# Patient Record
Sex: MALE | Race: WHITE | NOT HISPANIC OR LATINO | Employment: OTHER | URBAN - METROPOLITAN AREA
[De-identification: names, ages, dates, MRNs, and addresses within clinical notes are randomized per-mention and may not be internally consistent; named-entity substitution may affect disease eponyms.]

---

## 2017-01-11 ENCOUNTER — TELEPHONE (OUTPATIENT)
Dept: NEUROLOGY | Facility: CLINIC | Age: 67
End: 2017-01-11

## 2017-01-11 NOTE — TELEPHONE ENCOUNTER
----- Message from Armin Daigle sent at 1/11/2017 10:58 AM CST -----  Contact: Becka with [employer]  [phone] ext [phone]  Caller is calling to speak with Minnie about the prior authorization for the XENAZINE 12.5 mg tablet.

## 2017-02-21 ENCOUNTER — TELEPHONE (OUTPATIENT)
Dept: NEUROLOGY | Facility: CLINIC | Age: 67
End: 2017-02-21

## 2017-02-22 NOTE — TELEPHONE ENCOUNTER
----- Message from Angeles Smith sent at 2/21/2017 11:25 AM CST -----  Contact: Belen Godfrey (Spouse)  Belen states they will be in town Feb 23rd - Feb 27th would like to know if he can be seen due to some problems he is having?    Contact [phone]  Thanks

## 2017-02-22 NOTE — TELEPHONE ENCOUNTER
----- Message from Shelley Jules sent at 2/21/2017  4:53 PM CST -----  Contact: Charisse (wife) [phone]  Charisse is calling to get an appt for her  to see Dr. Porter for his declining movement.

## 2017-02-23 ENCOUNTER — OFFICE VISIT (OUTPATIENT)
Dept: NEUROLOGY | Facility: CLINIC | Age: 67
End: 2017-02-23
Payer: MEDICARE

## 2017-02-23 ENCOUNTER — LAB VISIT (OUTPATIENT)
Dept: LAB | Facility: HOSPITAL | Age: 67
End: 2017-02-23
Attending: PSYCHIATRY & NEUROLOGY
Payer: MEDICARE

## 2017-02-23 VITALS
HEART RATE: 50 BPM | SYSTOLIC BLOOD PRESSURE: 155 MMHG | BODY MASS INDEX: 28.41 KG/M2 | WEIGHT: 198.44 LBS | HEIGHT: 70 IN | DIASTOLIC BLOOD PRESSURE: 81 MMHG

## 2017-02-23 DIAGNOSIS — R30.0 DYSURIA: ICD-10-CM

## 2017-02-23 DIAGNOSIS — G10 HD (HUNTINGTON CHOREA): Primary | ICD-10-CM

## 2017-02-23 LAB
BILIRUB UR QL STRIP: NEGATIVE
CLARITY UR REFRACT.AUTO: CLEAR
COLOR UR AUTO: YELLOW
GLUCOSE UR QL STRIP: NEGATIVE
HGB UR QL STRIP: NEGATIVE
KETONES UR QL STRIP: NEGATIVE
LEUKOCYTE ESTERASE UR QL STRIP: NEGATIVE
NITRITE UR QL STRIP: NEGATIVE
PH UR STRIP: 5 [PH] (ref 5–8)
PROT UR QL STRIP: NEGATIVE
SP GR UR STRIP: 1.01 (ref 1–1.03)
URN SPEC COLLECT METH UR: NORMAL
UROBILINOGEN UR STRIP-ACNC: NEGATIVE EU/DL

## 2017-02-23 PROCEDURE — 99213 OFFICE O/P EST LOW 20 MIN: CPT | Mod: PBBFAC | Performed by: PSYCHIATRY & NEUROLOGY

## 2017-02-23 PROCEDURE — 99999 PR PBB SHADOW E&M-EST. PATIENT-LVL III: CPT | Mod: PBBFAC,,, | Performed by: PSYCHIATRY & NEUROLOGY

## 2017-02-23 PROCEDURE — 81003 URINALYSIS AUTO W/O SCOPE: CPT

## 2017-02-23 PROCEDURE — 99215 OFFICE O/P EST HI 40 MIN: CPT | Mod: S$PBB,,, | Performed by: PSYCHIATRY & NEUROLOGY

## 2017-02-23 RX ORDER — OLANZAPINE 5 MG/1
5 TABLET ORAL NIGHTLY
COMMUNITY
End: 2018-11-27

## 2017-02-23 RX ORDER — FERROUS SULFATE 325(65) MG
325 TABLET ORAL
COMMUNITY
End: 2022-07-08

## 2017-02-23 RX ORDER — TETRABENAZINE 12.5 MG/1
12.5 TABLET, COATED ORAL 3 TIMES DAILY
Qty: 270 TABLET | Refills: 3 | Status: SHIPPED | OUTPATIENT
Start: 2017-02-23 | End: 2018-06-12

## 2017-02-23 RX ORDER — DULOXETIN HYDROCHLORIDE 20 MG/1
20 CAPSULE, DELAYED RELEASE ORAL
COMMUNITY
Start: 2017-01-12 | End: 2022-05-26

## 2017-02-23 RX ORDER — OMEPRAZOLE 20 MG/1
20 CAPSULE, DELAYED RELEASE ORAL DAILY
COMMUNITY
End: 2022-07-08

## 2017-02-23 RX ORDER — LISINOPRIL 10 MG/1
10 TABLET ORAL
COMMUNITY

## 2017-02-23 RX ORDER — OXYBUTYNIN CHLORIDE 10 MG/1
10 TABLET, EXTENDED RELEASE ORAL
COMMUNITY
End: 2022-07-08

## 2017-02-23 RX ORDER — VIT C/E/ZN/COPPR/LUTEIN/ZEAXAN 250MG-90MG
1000 CAPSULE ORAL DAILY
COMMUNITY
End: 2022-07-08

## 2017-02-23 RX ORDER — GABAPENTIN 300 MG/1
300 CAPSULE ORAL
COMMUNITY
Start: 2017-01-30 | End: 2018-07-03

## 2017-02-23 RX ORDER — TAMSULOSIN HYDROCHLORIDE 0.4 MG/1
0.4 CAPSULE ORAL
COMMUNITY

## 2017-02-23 NOTE — LETTER
February 26, 2017      Troy Baker MD  [address]  [facility] [address]           [facility]  [address]  [facility] [address]  Phone: [phone]  Fax: [phone]          Patient: Filiberto Godfrey   MR Number: [id number]   YOB: 1950   Date of Visit: 2/23/2017       Dear Dr. Troy Baker:    Thank you for referring Filiberto Godfrey to me for evaluation. Attached you will find relevant portions of my assessment and plan of care.    If you have questions, please do not hesitate to call me. I look forward to following Filiberto Godfrey along with you.    Sincerely,    Xavi Porter MD    Enclosure  CC:  No Recipients    If you would like to receive this communication electronically, please contact [email or web address] or [phone] to request more information on [employer] access.    For providers and/or their staff who would like to refer a patient to [facility], please contact us through our one-stop-shop provider referral line, [facility], at [phone].    If you feel you have received this communication in error or would no longer like to receive these types of communications, please e-mail [email or web address]

## 2017-02-23 NOTE — PROGRESS NOTES
"Name: Filiberto Godfrey  MRN: [id number]   CSN: [id number]      Date: 02/23/2017    Referring physician:  Troy Baker MD  [address] [place] [address]    Chief Complaint / Interval History:   - Accompanied by his wife  - Saw Dr. Baker who instructed Mr. Godfrey to stop his tetrabenazine  - He had a lot of grunting tics  - After being off TBZ for three days, his chorea got really bad  - Was started on gabapentin by Dr. Baker after which his chorea continued to worsen and he began having rapid shallow breathing    - He had trouble getting a follow up appointment with Dr. Baker so he saw a family doctor, at that time he was breathing at a rate of 30s to 40s  - Gabapentin was increased and he was recommended to start on olanzapine but did not start this   - His walking became bad with toe walking on his right side  - Having some gait festination and freezing   - He is choking on water when he swallows  - Has had personality changes where he is turning into more of a "walflower" and has been more quiet  - He is no longer able to shave safely  - having dribbling of urine   - Urine is dark and odorous    From last visit 8/25/16:   - started tbz at interdisciplinary visit, only at night.  Movements the same, gait is worsening.  Not s ure why they never went higher on dosing.  - moving next month to [place] - will plan to see Olivia  - more repeating self and memory  - feels sick and tired of this type of living" - mentions the frustration with the grunts and trunk movements      History of Present Illness (HPI):  64 yo with HD    Movements - initially developed leg movements, "couldn't sit still" affecting arms and legs eventually.  Misdiagnosed as RLS at that time.  But progressed to involve whole body movements.  Saw "some neurologist" in [place].  Then saw Dr. Sanders, he referred to Kavita at [facility].      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   - invested $2.1 million, bad timing, but also some mismanagement from him that contributed to major losses.  Declared bankruptcy.     Mood - more irritable.  Tried Haldol for sleep about 2 years ago.  Now using Seroquel for sleep.  Restless at night, and gets up a lot at night.  Says he sleeps or lays down during the day to minimize the grunting.  On Wellbutrin/Klonopin/Tizanidine, unclear when or why prescribed.  Apparently he has trouble managing his medications, might be taking more klonopin during the day than prescribed.  Demonstrated recklessness in the past with gambling.  No extramarital affairs.  Has depression and feels like he is worthless, cannot stand the growling/grunting.  Lots of worry about the future and now.    Generally good until  of this year.  Movements increased.  Now more vocal tics and grunting.  Contributing to irritability.    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  Chaotic then, said they lost his blood test twice...    Family history: Mother  at age 69 (had tongue thrust, impaired gait, and mood/memory changes), brother has symptoms (CAG 41), two maternal cousins (unk CAG repeats).  Has two unaffected sibs (to date, 1 bro, 1 sis).     30+ years, 2 sons (age 30, 32), 3 grandkids.       Living in [facility].      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 No  Falls (Musculoskeletal)?Yes - at risk  Cognitive impairment (Neurologic)?Yes  Psychoses (Psychiatric)?No - no hallucinations or delusions  Pain/Paresthesia (Neurologic)?No  Visual changes (Eyes)?No  Moles / skin changes (Skin)?No  Stridor / SOB (Pulm)?No  Bruising  "(Heme)?No    Past Medical History: The patient  has a past medical history of Diabetes mellitus; Heart disease; Memory loss; Mental disorder; and Movement disorder.    Social History: The patient  reports that he has never smoked. He does not have any smokeless tobacco history on file. He reports that he does not drink alcohol or use illicit drugs.    Family History: Their family history is not on file.    Allergies: Review of patient's allergies indicates no known allergies.     Meds:   Current Outpatient Prescriptions on File Prior to Visit   Medication Sig Dispense Refill    amlodipine (NORVASC) 10 MG tablet       aspirin (ECOTRIN) 81 MG EC tablet Take 81 mg by mouth once daily.      atorvastatin (LIPITOR) 20 MG tablet Take 20 mg by mouth once daily.      clonazePAM (KLONOPIN) 1 MG tablet 1 mg.       docusate sodium (COLACE) 50 MG capsule Take by mouth 2 (two) times daily.      isosorbide mononitrate (IMDUR) 30 MG 24 hr tablet Take 20 mg by mouth once daily.      trazodone (DESYREL) 50 MG tablet Take 50 mg by mouth every evening.      XENAZINE 12.5 mg tablet TAKE 1 TABLET (12.5MG) DAILY FOR 1 WEEK, THEN TAKE 1 TABLET TWICE DAILY FOR 1 WEEK, THEN TAKE 1 TABLET 3 TIMES DAILY THEREAFTER. MAY BE TAKE 69 tablet 3    buPROPion (WELLBUTRIN SR) 150 MG TBSR 12 hr tablet       meclizine (ANTIVERT) 25 mg tablet       nitroGLYCERIN (NITROSTAT) 0.4 MG SL tablet Place 0.4 mg under the tongue every 5 (five) minutes as needed for Chest pain.      ondansetron (ZOFRAN-ODT) 4 MG TbDL       tizanidine 4 mg Cap Take 4 mg by mouth once daily.       No current facility-administered medications on file prior to visit.        Exam:  Visit Vitals    BP (!) 155/81 (BP Location: Left arm, Patient Position: Sitting, BP Method: Automatic)    Pulse (!) 50    Ht 5' 10" (1.778 m)    Wt 90 kg (198 lb 6.6 oz)    BMI 28.47 kg/m2       * Specialized movement exam  No hypophonic speech.    No facial masking.   No cogwheel rigidity.  "    + mild B bradykinesia.   No tremor with rest, posture, kinesis, or intention.    See UHDRS below.  Includes chorea and grunting tics.   No myoclonus.   No motor impersistence.   Wide-based gait.   No shortened stride length.  + extensor abnormal arm swing.     ++ postural instability.      UHDRS Chorea Score    Face 2   NITIN 2   Trunk 3   RUE 3   LUE 3   RLE 3   LLE 3       TOTAL 19       0 Absent  1 Slight/intermittent  2 Mild/common or moderate/intermittent  3 Moderate/common  4  Marked/prolonged    Laboratory/Radiological:  - Results:No results found for any previous visit.    - Independent review of images:    Diagnoses:          1) HD, moderate in severity at this time.  WORSE  2) Depression, contracts today for safety    Medical Decision Makin) Resume the TBZ 12.5mg BID  2) SLP and swallow eval  3) Reorder therapy, PT and gait training    4) Check urinalysis, urine cx    Roland Crouch MD  Movement Disorders Fellow  [facility]    ===========  Patient seen and examined.  I agree with the history, exam, assessment and plan within the fellow's note as stated above.  Note has been edited by me to reflect my work and changes.    Going back on TBZ now, will continue to follow here.  Risks and benefits discussed.  Rest of plan as above.    Xavi Porter MD, MPH  Division of Movement and Memory Disorders  [facility]

## 2018-06-05 ENCOUNTER — TELEPHONE (OUTPATIENT)
Dept: NEUROLOGY | Facility: CLINIC | Age: 68
End: 2018-06-05

## 2018-06-05 DIAGNOSIS — R13.10 DYSPHAGIA, UNSPECIFIED TYPE: ICD-10-CM

## 2018-06-05 DIAGNOSIS — R26.9 GAIT DISORDER: ICD-10-CM

## 2018-06-05 DIAGNOSIS — G10 HD (HUNTINGTON CHOREA): Primary | ICD-10-CM

## 2018-06-05 NOTE — TELEPHONE ENCOUNTER
----- Message from Mika Mendenhall sent at 6/5/2018 12:02 PM CDT -----  Needs Advice    Reason for call: Belen is calling to schedule an appt w/ the doctor either this week or next week on Monday. Pt last seen 02/23/17. Belen states pt is having speech difficulties and strength/walking difficulties.  Communication Preference: Belen (mom) @ [phone]  Additional Information:

## 2018-06-05 NOTE — TELEPHONE ENCOUNTER
Returned call to Belen and she states swallowing and balance is worse. He having trouble finding his words. DTBZ was prescribe by Dr. Baker on 9mg BID. Started medication in January. He has had study decline at this point. They are in town this week. Explained we are out at the [facility] [place] this week.     She is not sure if they need to get back on TBZ or SLP and swallow study and PT needs for patient. Offered appointment Friday, but will not be in town.     Needs a call back.     Vanesa  HD Clinic Coordinator

## 2018-06-06 ENCOUNTER — OFFICE VISIT (OUTPATIENT)
Dept: NEUROLOGY | Facility: CLINIC | Age: 68
End: 2018-06-06
Payer: MEDICARE

## 2018-06-06 VITALS
SYSTOLIC BLOOD PRESSURE: 131 MMHG | HEART RATE: 75 BPM | DIASTOLIC BLOOD PRESSURE: 76 MMHG | WEIGHT: 198.44 LBS | BODY MASS INDEX: 28.41 KG/M2 | HEIGHT: 70 IN

## 2018-06-06 DIAGNOSIS — G10 HUNTINGTON DISEASE: Primary | ICD-10-CM

## 2018-06-06 PROCEDURE — 99999 PR PBB SHADOW E&M-EST. PATIENT-LVL III: CPT | Mod: PBBFAC,,, | Performed by: PSYCHIATRY & NEUROLOGY

## 2018-06-06 PROCEDURE — 99213 OFFICE O/P EST LOW 20 MIN: CPT | Mod: PBBFAC | Performed by: PSYCHIATRY & NEUROLOGY

## 2018-06-06 PROCEDURE — 99215 OFFICE O/P EST HI 40 MIN: CPT | Mod: S$PBB,,, | Performed by: PSYCHIATRY & NEUROLOGY

## 2018-06-06 RX ORDER — FINASTERIDE 5 MG/1
TABLET, FILM COATED ORAL
Refills: 3 | COMMUNITY
Start: 2018-03-28

## 2018-06-06 RX ORDER — AMOXICILLIN 250 MG
1 CAPSULE ORAL
COMMUNITY
End: 2022-07-08

## 2018-06-06 NOTE — PROGRESS NOTES
"Name: Filiberto Godfrey  MRN: [id number]   CSN: [id number]      Date: 06/06/2018    Referring physician:  No referring provider defined for this encounter.    Chief Complaint / Interval History:  - worsened speech and mouth movements that get in the way of communicating  - more grunting, more repeating phrases, some vocal tics, more tongue protrusiona nd bumping his head back, keeps his legs crossed and actually hits up against the underside of the table  - choking was a bad but now MUCH better (her words), rare cough after drinking  - a little mood instability  - constipated of and on   - some urinary urgency and cannot control it  - using finasteride 5 mg 2 months ago   - no LH or dizziness when standing  - loss of equilibrium doubled over 6 months     From FEb 2017:  - Accompanied by his wife  - Saw Dr. Baker who instructed Mr. Godfrey to stop his tetrabenazine  - He had a lot of grunting tics  - After being off TBZ for three days, his chorea got really bad  - Was started on gabapentin by Dr. Baker after which his chorea continued to worsen and he began having rapid shallow breathing    - He had trouble getting a follow up appointment with Dr. Baker so he saw a family doctor, at that time he was breathing at a rate of 30s to 40s  - Gabapentin was increased and he was recommended to start on olanzapine but did not start this   - His walking became bad with toe walking on his right side  - Having some gait festination and freezing   - He is choking on water when he swallows  - Has had personality changes where he is turning into more of a "walflower" and has been more quiet  - He is no longer able to shave safely  - having dribbling of urine   - Urine is dark and odorous    From last visit 8/25/16:   - started tbz at interdisciplinary visit, only at night.  Movements the same, gait is worsening.  Not s ure why they never went higher on dosing.  - moving next month to [place] - will plan to see Olivia  - more repeating " "self and memory  - feels sick and tired of this type of living" - mentions the frustration with the grunts and trunk movements      History of Present Illness (HPI):  64 yo with HD    Movements - initially developed leg movements, "couldn't sit still" affecting arms and legs eventually.  Misdiagnosed as RLS at that time.  But progressed to involve whole body movements.  Saw "some neurologist" in [place].  Then saw Dr. Sanders, he referred to Kavita at [facility].      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  2008 - invested $2.1 million, bad timing, but also some mismanagement from him that contributed to major losses.  Declared bankruptcy.     Mood - more irritable.  Tried Haldol for sleep about 2 years ago.  Now using Seroquel for sleep.  Restless at night, and gets up a lot at night.  Says he sleeps or lays down during the day to minimize the grunting.  On Wellbutrin/Klonopin/Tizanidine, unclear when or why prescribed.  Apparently he has trouble managing his medications, might be taking more klonopin during the day than prescribed.  Demonstrated recklessness in the past with gambling.  No extramarital affairs.  Has depression and feels like he is worthless, cannot stand the growling/grunting.  Lots of worry about the future and now.    Generally good until Altaf of this year.  Movements increased.  Now more vocal tics and grunting.  Contributing to irritability.    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  Chaotic then, said they lost his blood test twice...    Family history: Mother  at age 69 (had tongue thrust, impaired gait, and mood/memory changes), brother has symptoms (CAG 41), two maternal cousins (unk CAG repeats).  Has two unaffected sibs (to date, 1 bro, 1 sis).     30+ years, 2 sons (age 30, 32), 3 grandkids.       Living in [facility].      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 No  Falls (Musculoskeletal)?Yes - at risk  Cognitive impairment (Neurologic)?Yes  Psychoses (Psychiatric)?No - no hallucinations or delusions  Pain/Paresthesia (Neurologic)?No  Visual changes (Eyes)?No  Moles / skin changes (Skin)?No  Stridor / SOB (Pulm)?No  Bruising (Heme)?No    Past Medical History: The patient  has a past medical history of Diabetes mellitus; Heart disease; Memory loss; Mental disorder; and Movement disorder.    Social History: The patient  reports that he has never smoked. He does not have any smokeless tobacco history on file. He reports that he does not drink alcohol or use drugs.    Family History: Their family history is not on file.    Allergies: Patient has no known allergies.     Meds:   Current Outpatient Prescriptions on File Prior to Visit   Medication Sig Dispense Refill    amlodipine (NORVASC) 10 MG tablet       aspirin (ECOTRIN) 81 MG EC tablet Take 81 mg by mouth once daily.      atorvastatin (LIPITOR) 20 MG tablet Take 20 mg by mouth once daily.      cholecalciferol, vitamin D3, (VITAMIN D3) 1,000 unit capsule Take 1,000 Units by mouth once daily.      clonazePAM (KLONOPIN) 1 MG tablet 1 mg.       docusate sodium (COLACE) 50 MG capsule Take by mouth 2 (two) times daily.      ferrous sulfate 325 mg (65 mg iron) Tab tablet Take 325 mg by mouth daily with breakfast.      isosorbide mononitrate (IMDUR) 30 MG 24 hr tablet Take 20 mg by mouth once daily.      lisinopril 10 MG tablet Take 10 mg by mouth.      olanzapine (ZYPREXA) 5 MG tablet Take 5 mg by mouth every evening.      omeprazole (PRILOSEC) 20 MG capsule Take 20 mg by mouth once daily.      ondansetron (ZOFRAN-ODT) 4  "MG TbDL       SITagliptan-metformin (JANUMET) 50-1,000 mg per tablet Take 1 tablet by mouth 2 (two) times daily with meals.      tamsulosin (FLOMAX) 0.4 mg Cp24 Take 0.4 mg by mouth.      buPROPion (WELLBUTRIN SR) 150 MG TBSR 12 hr tablet       duloxetine (CYMBALTA) 20 MG capsule Take 20 mg by mouth.      gabapentin (NEURONTIN) 300 MG capsule Take 300 mg by mouth.      meclizine (ANTIVERT) 25 mg tablet       nitroGLYCERIN (NITROSTAT) 0.4 MG SL tablet Place 0.4 mg under the tongue every 5 (five) minutes as needed for Chest pain.      oxybutynin (DITROPAN-XL) 10 MG 24 hr tablet Take 10 mg by mouth.      tetrabenazine (XENAZINE) 12.5 mg tablet Take 1 tablet (12.5 mg total) by mouth 3 (three) times daily. 270 tablet 3    tizanidine 4 mg Cap Take 4 mg by mouth once daily.      trazodone (DESYREL) 50 MG tablet Take 50 mg by mouth every evening.      XENAZINE 12.5 mg tablet TAKE 1 TABLET (12.5MG) DAILY FOR 1 WEEK, THEN TAKE 1 TABLET TWICE DAILY FOR 1 WEEK, THEN TAKE 1 TABLET 3 TIMES DAILY THEREAFTER. MAY BE TAKE 69 tablet 3     No current facility-administered medications on file prior to visit.        Exam:  /76   Pulse 75   Ht 5' 10" (1.778 m)   Wt 90 kg (198 lb 6.6 oz)   BMI 28.47 kg/m²     * Specialized movement exam  No hypophonic speech.    No facial masking.   No cogwheel rigidity.     + mild B bradykinesia.   No tremor with rest, posture, kinesis, or intention.    See UHDRS below.  Includes chorea and grunting tics.   No myoclonus.   No motor impersistence.   Wide-based gait.   No shortened stride length.  + extensor abnormal arm swing.     ++ postural instability.      UHDRS Chorea Score    Face 2   NITIN 2   Trunk 3   RUE 3   LUE 3   RLE 3   LLE 3       TOTAL 19       0 Absent  1 Slight/intermittent  2 Mild/common or moderate/intermittent  3 Moderate/common  4  Marked/prolonged    Laboratory/Radiological:  - Results:  No visits with results within 3 Month(s) from this visit.   Latest known " visit with results is:   Lab Visit on 02/23/2017   Component Date Value Ref Range Status    Specimen UA 02/23/2017 Urine, Unspecified   Final    Color, UA 02/23/2017 Yellow  Yellow, Straw, Khushi Final    Appearance, UA 02/23/2017 Clear  Clear Final    pH, UA 02/23/2017 5.0  5.0 - 8.0 Final    Specific Gravity, UA 02/23/2017 1.015  1.005 - 1.030 Final    Protein, UA 02/23/2017 Negative  Negative Final    Glucose, UA 02/23/2017 Negative  Negative Final    Ketones, UA 02/23/2017 Negative  Negative Final    Bilirubin (UA) 02/23/2017 Negative  Negative Final    Occult Blood UA 02/23/2017 Negative  Negative Final    Nitrite, UA 02/23/2017 Negative  Negative Final    Urobilinogen, UA 02/23/2017 Negative  <2.0 EU/dL Final    Leukocytes, UA 02/23/2017 Negative  Negative Final       - Independent review of images:    Diagnoses:          1) HD, moderate in severity at this time.  WORSE  2) Depression, contracts today for safety    Medical Decision Making:  - pushing austedo to 12 AM and 9 PM --> 12-12  - namenda next  - pt/ot  - vv in 4-6 weeks  -  -      Xavi Porter MD, MPH  Division of Movement and Memory Disorders  [facility]

## 2018-06-07 ENCOUNTER — TELEPHONE (OUTPATIENT)
Dept: SPEECH THERAPY | Facility: HOSPITAL | Age: 68
End: 2018-06-07

## 2018-06-12 ENCOUNTER — TELEPHONE (OUTPATIENT)
Dept: SPEECH THERAPY | Facility: HOSPITAL | Age: 68
End: 2018-06-12

## 2018-06-12 ENCOUNTER — TELEPHONE (OUTPATIENT)
Dept: NEUROLOGY | Facility: CLINIC | Age: 68
End: 2018-06-12

## 2018-06-12 ENCOUNTER — TELEPHONE (OUTPATIENT)
Dept: PHARMACY | Facility: CLINIC | Age: 68
End: 2018-06-12

## 2018-06-12 DIAGNOSIS — R13.10 DYSPHAGIA, UNSPECIFIED TYPE: ICD-10-CM

## 2018-06-12 DIAGNOSIS — R47.1 DYSARTHRIA: ICD-10-CM

## 2018-06-12 DIAGNOSIS — G10 HD (HUNTINGTON CHOREA): Primary | ICD-10-CM

## 2018-06-12 NOTE — TELEPHONE ENCOUNTER
Pt wife stated that her  is getting austedo 15 mg a.m and 9 mg p.m through [facility].  Pt would also like to get swallow study done at a facility near his location. Order is in and routed over to provider.

## 2018-06-12 NOTE — TELEPHONE ENCOUNTER
Austedo pre-verification complete. No DDIs with [facility] encountered upon initial review.   Tetrabenazine 50 mg/day = deutetrabenazine 12 mg twice daily

## 2018-06-12 NOTE — TELEPHONE ENCOUNTER
----- Message from Kimmy Torres sent at 6/12/2018  3:10 PM CDT -----  Contact: Elise (wife) @ [phone]  Calling to speak with someone in Dr. Porter's office regarding the patient swallow test, asking to have the referrals/orders sent to the patient so he can have the test closer to home. Says she needs to speak with someone regarding the medication change that the doctor made to the dosage. Please call.

## 2018-06-13 NOTE — TELEPHONE ENCOUNTER
DOCUMENTATION ONLY:  Prior Authorization for [employer] approved from 02/13/2018 to 12/31/2018    Case Id: [id number]    Co-pay: $2746.70    Patient Assistance IS required and is being researched.   SHYANNE

## 2018-06-13 NOTE — TELEPHONE ENCOUNTER
Called patient's wife and they would like to have MBSS done at [facility] at [phone]. Called and spoke to Landy and she states they need procedure codes and diagnose code before scheduling patient for MBSS.She states they can not schedule without procedure codes.

## 2018-06-21 ENCOUNTER — TELEPHONE (OUTPATIENT)
Dept: NEUROLOGY | Facility: CLINIC | Age: 68
End: 2018-06-21

## 2018-06-21 NOTE — TELEPHONE ENCOUNTER
Called [facility] and clarified Rx. Also called and updated patient's wife as well.     Called to scheduled MBSS as well for patient now we have procedure code of 32205.     Orders has been faxed to [phone] and patient is scheduled for June 27 at 9am. Patient is currently receiving home health for PT/OT. Patient's wife make aware of appointment and confirmed time and date. She had no further questions.     Vanesa   HD Clinic Coordinator

## 2018-06-21 NOTE — TELEPHONE ENCOUNTER
----- Message from Kimmy Torres sent at 6/21/2018  1:09 PM CDT -----  Contact: Belen (wife) @ [phone]  Calling to speak with someone on medication: deutetrabenazine 6 mg Tab, says that the doctor told them something completely different from the prescription. Asking for clarification and to please call [facility] ([facility]), today to have the medication shipped today.

## 2018-06-27 ENCOUNTER — TELEPHONE (OUTPATIENT)
Dept: NEUROLOGY | Facility: CLINIC | Age: 68
End: 2018-06-27

## 2018-06-27 NOTE — TELEPHONE ENCOUNTER
----- Message from Armin Daigle sent at 6/27/2018  9:39 AM CDT -----  Contact: [facility] @ [phone]   Caller is calling to get clarity on the orders, pls call ASAP pt is @ the location now.

## 2018-06-27 NOTE — TELEPHONE ENCOUNTER
----- Message from Carlene Lam sent at 6/27/2018  3:48 PM CDT -----  Contact: Elise (Wife) [phone]  Needs Advice    Reason for call:   The patient would like to speak to someone regarding a release form to receive the patient's test results.    Communication Preference:PHONE     Additional Information:

## 2018-06-27 NOTE — TELEPHONE ENCOUNTER
Returned call and clarified orders for MBSS. They had no further questions.     Vanesa  HD Clinic Coordinator

## 2018-06-27 NOTE — TELEPHONE ENCOUNTER
Returned call to pt's wife and she explained we need a MANPREET to received the results. Explained will call POS to see what is needed.     Called and spoke to Rosalind and she states the results were mailed to office. She ask if we would like a copy of the results fax to office. Ask to have report faxed to the office. She states she will have results faxed to the office by the end of day tomorrow and no MANPREET is needed as Dr. Porter is the ordering provider. She ask to call back if had not received.     Vanesa   HD Clinic Coordinator

## 2018-06-29 ENCOUNTER — TELEPHONE (OUTPATIENT)
Dept: NEUROLOGY | Facility: CLINIC | Age: 68
End: 2018-06-29

## 2018-06-29 NOTE — TELEPHONE ENCOUNTER
----- Message from Kimmy Torres sent at 6/29/2018  2:32 PM CDT -----  Contact: Elise (wife) @ [phone]  Calling to speak with someone in Dr. Porter's office regarding, says the office knows what the call is about. Asking if they need to come to the office or its information that the doctor can give over the phone. Please call.

## 2018-06-29 NOTE — TELEPHONE ENCOUNTER
Returned call to wife and explained we had not received the faxed report to the office. She states she will go and get the report and send to the office. Explained once received we will give the report to Dr. Porter to review.

## 2018-07-03 ENCOUNTER — TELEPHONE (OUTPATIENT)
Dept: GASTROENTEROLOGY | Facility: CLINIC | Age: 68
End: 2018-07-03

## 2018-07-03 ENCOUNTER — HOSPITAL ENCOUNTER (OUTPATIENT)
Dept: CARDIOLOGY | Facility: CLINIC | Age: 68
Discharge: HOME | End: 2018-07-03
Payer: MEDICARE

## 2018-07-03 ENCOUNTER — OFFICE VISIT (OUTPATIENT)
Dept: NEUROLOGY | Facility: CLINIC | Age: 68
End: 2018-07-03
Payer: MEDICARE

## 2018-07-03 VITALS
SYSTOLIC BLOOD PRESSURE: 123 MMHG | DIASTOLIC BLOOD PRESSURE: 79 MMHG | HEART RATE: 70 BPM | WEIGHT: 199.31 LBS | HEIGHT: 70 IN | BODY MASS INDEX: 28.53 KG/M2

## 2018-07-03 DIAGNOSIS — G10 HUNTINGTON DISEASE: Primary | ICD-10-CM

## 2018-07-03 DIAGNOSIS — G10 HUNTINGTON DISEASE: ICD-10-CM

## 2018-07-03 PROCEDURE — 93010 ELECTROCARDIOGRAM REPORT: CPT | Mod: S$PBB,,, | Performed by: INTERNAL MEDICINE

## 2018-07-03 PROCEDURE — 99213 OFFICE O/P EST LOW 20 MIN: CPT | Mod: PBBFAC,25 | Performed by: PSYCHIATRY & NEUROLOGY

## 2018-07-03 PROCEDURE — 99999 PR PBB SHADOW E&M-EST. PATIENT-LVL III: CPT | Mod: PBBFAC,,, | Performed by: PSYCHIATRY & NEUROLOGY

## 2018-07-03 PROCEDURE — 99214 OFFICE O/P EST MOD 30 MIN: CPT | Mod: S$PBB,,, | Performed by: PSYCHIATRY & NEUROLOGY

## 2018-07-03 PROCEDURE — 93005 ELECTROCARDIOGRAM TRACING: CPT | Mod: PBBFAC | Performed by: INTERNAL MEDICINE

## 2018-07-03 NOTE — LETTER
July 10, 2018      James Lomeli MD  [address] [place]  [facility] [address]           [facility]  [address]  [place] [address]  Phone: [phone]  Fax: [phone]          Patient: Filiberto Godfrey   MR Number: [id number]   YOB: 1950   Date of Visit: 7/3/2018       Dear Dr. James Lomeli:    Thank you for referring Filiberto Godfrey to me for evaluation. Attached you will find relevant portions of my assessment and plan of care.    If you have questions, please do not hesitate to call me. I look forward to following Filiberto Godfrey along with you.    Sincerely,    Tiana Berrios  CC:  No Recipients    If you would like to receive this communication electronically, please contact [email or web address] or [phone] to request more information on "[employer] Link access.    For providers and/or their staff who would like to refer a patient to [facility], please contact us through our one-stop-shop provider referral line, [facility], at [phone].    If you feel you have received this communication in error or would no longer like to receive these types of communications, please e-mail [email or web address]

## 2018-07-03 NOTE — PATIENT INSTRUCTIONS
New dose of Austedo. Increased to 15mg twice a day.     Austedo 6mg+9mg twice a daily = total of 15mg twice a day.     Set up [employer] account.     Schedule a video visit with Dr. Porter in the next 4-6 weeks after increase of medication.     Called [facility] direct line for any questions. [phone]. You can reach Vanesa or ELISE Cancino at this number.     Vanesa   HD Clinic Coordinator

## 2018-07-03 NOTE — TELEPHONE ENCOUNTER
----- Message from Norma Pelaez sent at 7/3/2018 12:46 PM CDT -----  Contact: javier/neurology - [phone]  Needs peg placement - please call javier/neurology - [phone]

## 2018-07-03 NOTE — PROGRESS NOTES
"Name: Filiberto Godfrey  MRN: [id number]   CSN: [id number]      Date: 07/03/2018    Referring physician:  James Lomeli MD  [address] [place] [address] Dr Larson [address] [place] [address]    Chief Complaint / Interval History:  - generally less aware  - more swallow issues  - fall risk  - willing to wiggle meds  - not traveling 'for nothing"      Last seen 6/6/18:  - worsened speech and mouth movements that get in the way of communicating  - more grunting, more repeating phrases, some vocal tics, more tongue protrusiona nd bumping his head back, keeps his legs crossed and actually hits up against the underside of the table  - choking was a bad but now MUCH better (her words), rare cough after drinking  - a little mood instability  - constipated of and on   - some urinary urgency and cannot control it  - using finasteride 5 mg 2 months ago   - no LH or dizziness when standing  - loss of equilibrium doubled over 6 months     From FEb 2017:  - Accompanied by his wife  - Saw Dr. Baker who instructed Mr. Godfrey to stop his tetrabenazine  - He had a lot of grunting tics  - After being off TBZ for three days, his chorea got really bad  - Was started on gabapentin by Dr. Baker after which his chorea continued to worsen and he began having rapid shallow breathing    - He had trouble getting a follow up appointment with Dr. Baker so he saw a family doctor, at that time he was breathing at a rate of 30s to 40s  - Gabapentin was increased and he was recommended to start on olanzapine but did not start this   - His walking became bad with toe walking on his right side  - Having some gait festination and freezing   - He is choking on water when he swallows  - Has had personality changes where he is turning into more of a "walflower" and has been more quiet  - He is no longer able to shave safely  - having dribbling of urine   - Urine is dark and odorous    From last visit 8/25/16:   - started tbz at interdisciplinary visit, only at night.  " "Movements the same, gait is worsening.  Not s ure why they never went higher on dosing.  - moving next month to [place] - will plan to see Olivia  - more repeating self and memory  - feels sick and tired of this type of living" - mentions the frustration with the grunts and trunk movements      History of Present Illness (HPI):  66 yo with HD    Movements - initially developed leg movements, "couldn't sit still" affecting arms and legs eventually.  Misdiagnosed as RLS at that time.  But progressed to involve whole body movements.  Saw "some neurologist" in [place].  Then saw Dr. Sanders, he referred to Kavita at [facility].      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   - invested $2.1 million, bad timing, but also some mismanagement from him that contributed to major losses.  Declared bankruptcy.     Mood - more irritable.  Tried Haldol for sleep about 2 years ago.  Now using Seroquel for sleep.  Restless at night, and gets up a lot at night.  Says he sleeps or lays down during the day to minimize the grunting.  On Wellbutrin/Klonopin/Tizanidine, unclear when or why prescribed.  Apparently he has trouble managing his medications, might be taking more klonopin during the day than prescribed.  Demonstrated recklessness in the past with gambling.  No extramarital affairs.  Has depression and feels like he is worthless, cannot stand the growling/grunting.  Lots of worry about the future and now.    Generally good until Altaf of this year.  Movements increased.  Now more vocal tics and grunting.  Contributing to irritability.    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  Chaotic then, said they lost his blood test twice...    Family history: Mother  at age 69 (had tongue thrust, impaired gait, and mood/memory changes), brother has symptoms (CAG 41), two maternal cousins (unk CAG repeats).  Has two unaffected sibs (to " date, 1 bro, 1 sis).     30+ years, 2 sons (age 30, 32), 3 grandkids.       Living in [facility].      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 No  Falls (Musculoskeletal)?Yes - at risk  Cognitive impairment (Neurologic)?Yes  Psychoses (Psychiatric)?No - no hallucinations or delusions  Pain/Paresthesia (Neurologic)?No  Visual changes (Eyes)?No  Moles / skin changes (Skin)?No  Stridor / SOB (Pulm)?No  Bruising (Heme)?No    Past Medical History: The patient  has a past medical history of Diabetes mellitus; Heart disease; Memory loss; Mental disorder; and Movement disorder.    Social History: The patient  reports that he has never smoked. He does not have any smokeless tobacco history on file. He reports that he does not drink alcohol or use drugs.    Family History: Their family history is not on file.    Allergies: Patient has no known allergies.     Meds:   Current Outpatient Prescriptions on File Prior to Visit   Medication Sig Dispense Refill    amlodipine (NORVASC) 10 MG tablet       aspirin (ECOTRIN) 81 MG EC tablet Take 81 mg by mouth once daily.      atorvastatin (LIPITOR) 20 MG tablet Take 20 mg by mouth once daily.      cholecalciferol, vitamin D3, (VITAMIN D3) 1,000 unit capsule Take 1,000 Units by mouth once daily.      clonazePAM (KLONOPIN) 1 MG tablet 1 mg.       docusate sodium (COLACE) 50 MG capsule Take by mouth 2 (two) times daily.      duloxetine (CYMBALTA) 20 MG capsule Take 20 mg by mouth.      ferrous sulfate 325 mg (65 mg iron) Tab tablet Take 325 mg by mouth daily with breakfast.      finasteride (PROSCAR) 5 mg tablet 1 Tablet Once a day THANK YOU  3    isosorbide mononitrate  "(IMDUR) 30 MG 24 hr tablet Take 20 mg by mouth once daily.      lisinopril 10 MG tablet Take 10 mg by mouth.      nitroGLYCERIN (NITROSTAT) 0.4 MG SL tablet Place 0.4 mg under the tongue every 5 (five) minutes as needed for Chest pain.      olanzapine (ZYPREXA) 5 MG tablet Take 5 mg by mouth every evening.      omeprazole (PRILOSEC) 20 MG capsule Take 20 mg by mouth once daily.      ondansetron (ZOFRAN-ODT) 4 MG TbDL       ranitidine (ZANTAC) 150 MG tablet Take 150 mg by mouth.      senna-docusate 8.6-50 mg (PERICOLACE) 8.6-50 mg per tablet Take 1 tablet by mouth.      SITagliptan-metformin (JANUMET) 50-1,000 mg per tablet Take 1 tablet by mouth 2 (two) times daily with meals.      tamsulosin (FLOMAX) 0.4 mg Cp24 Take 0.4 mg by mouth.      tizanidine 4 mg Cap Take 4 mg by mouth once daily.      trazodone (DESYREL) 50 MG tablet Take 50 mg by mouth every evening.      buPROPion (WELLBUTRIN SR) 150 MG TBSR 12 hr tablet       gabapentin (NEURONTIN) 300 MG capsule Take 300 mg by mouth.      meclizine (ANTIVERT) 25 mg tablet       oxybutynin (DITROPAN-XL) 10 MG 24 hr tablet Take 10 mg by mouth.      [DISCONTINUED] deutetrabenazine 6 mg Tab Take 2 tablets (12 mg) by mouth 2 (two) times daily. 120 tablet 11     No current facility-administered medications on file prior to visit.        Exam:  /79   Pulse 70   Ht 5' 10" (1.778 m)   Wt 90.4 kg (199 lb 4.7 oz)   BMI 28.60 kg/m²     * Specialized movement exam  No hypophonic speech.    No facial masking.   No cogwheel rigidity.     + mild B bradykinesia.   No tremor with rest, posture, kinesis, or intention.    See UHDRS below.  Includes chorea and grunting tics.   No myoclonus.   No motor impersistence.   Wide-based gait.   No shortened stride length.  + extensor abnormal arm swing.     ++ postural instability.      UHDRS Chorea Score    Face 2   NITIN 2   Trunk 2   RUE 2   LUE 2   RLE 2   LLE 2       TOTAL 14 "       0 Absent  1 Slight/intermittent  2 Mild/common or moderate/intermittent  3 Moderate/common  4  Marked/prolonged    Laboratory/Radiological:  - Results:  No visits with results within 3 Month(s) from this visit.   Latest known visit with results is:   Lab Visit on 02/23/2017   Component Date Value Ref Range Status    Specimen UA 02/23/2017 Urine, Unspecified   Final    Color, UA 02/23/2017 Yellow  Yellow, Straw, Khushi Final    Appearance, UA 02/23/2017 Clear  Clear Final    pH, UA 02/23/2017 5.0  5.0 - 8.0 Final    Specific Gravity, UA 02/23/2017 1.015  1.005 - 1.030 Final    Protein, UA 02/23/2017 Negative  Negative Final    Glucose, UA 02/23/2017 Negative  Negative Final    Ketones, UA 02/23/2017 Negative  Negative Final    Bilirubin (UA) 02/23/2017 Negative  Negative Final    Occult Blood UA 02/23/2017 Negative  Negative Final    Nitrite, UA 02/23/2017 Negative  Negative Final    Urobilinogen, UA 02/23/2017 Negative  <2.0 EU/dL Final    Leukocytes, UA 02/23/2017 Negative  Negative Final     - Independent review of images:     Diagnoses:          1) HD, moderate in severity at this time.  WORSE  2) Depression, contracts today for safety    Medical Decision Making:  - increase austedo again to 15 - 15 now, efforts to improve chorea and stabilize swallow  - ekg today  - plan namenda next    From coord:  New dose of Austedo. Increased to 15mg twice a day.     Austedo 6mg+9mg twice a daily = total of 15mg twice a day.     Set up [employer] account.     Schedule a video visit with Dr. Porter in the next 4-6 weeks after increase of medication.     Called [facility] direct line for any questions. [phone]. You can reach Vanesa or ELISE Cancino at this number.     Vanesa   HD Clinic Coordinator       Xavi Porter MD, MPH  Division of Movement and Memory Disorders  [facility]

## 2018-07-05 ENCOUNTER — TELEPHONE (OUTPATIENT)
Dept: NEUROLOGY | Facility: CLINIC | Age: 68
End: 2018-07-05

## 2018-07-05 NOTE — TELEPHONE ENCOUNTER
Returned call to pt's wife and informed EKG was normal and per Dr. Charlotte torre to start with increased dose of medication.     Vanesa   HD Clinic Coordinator

## 2018-07-05 NOTE — TELEPHONE ENCOUNTER
----- Message from Kimmy Torres sent at 7/5/2018  9:09 AM CDT -----  Contact: Elise (wife) @ [phone]  Calling to inform the doctor that [facility] has called this morning with increase in the medication: deutetrabenazine (AUSTEDO) 9 mg Tab, and that she was told that the office got the ok on the 1st. Calling to confirm that the patients EKG was normal. Please call

## 2018-07-09 ENCOUNTER — TELEPHONE (OUTPATIENT)
Dept: NEUROLOGY | Facility: CLINIC | Age: 68
End: 2018-07-09

## 2018-07-09 NOTE — TELEPHONE ENCOUNTER
Called pt wife and left a vm informing her that the order for speech therapy will be resubmitted in as external before its faxed off. Also informed pt wife that in order to have pt results released they will have to sign a release authorization form or go to medical release and have it sent. I mentioned if pt have any questions regarding the vm to call back.

## 2018-07-09 NOTE — TELEPHONE ENCOUNTER
----- Message from Kimmy Torres sent at 7/9/2018  9:33 AM CDT -----  Contact: Elise (wife) @ [phone]  Calling to speak with someone in Dr. Porter's office regarding getting a order for speech therapy @ [facility] fax# [phone]. Asking that a copy of the swallow test be sent as well, make attention: Quin

## 2018-07-09 NOTE — TELEPHONE ENCOUNTER
----- Message from Armin Daigle sent at 7/9/2018  2:26 PM CDT -----  Contact: Elise ( spouse ) @ [phone]  Caller is requesting a return phone call from Landy, ;caller has the records for the swallow test, she needs an order for Speech Therapy, pls call

## 2018-07-11 ENCOUNTER — TELEPHONE (OUTPATIENT)
Dept: NEUROLOGY | Facility: CLINIC | Age: 68
End: 2018-07-11

## 2018-07-11 DIAGNOSIS — G10 HD (HUNTINGTON CHOREA): Primary | ICD-10-CM

## 2018-07-11 NOTE — TELEPHONE ENCOUNTER
----- Message from Armin Daigle sent at 7/11/2018  9:13 AM CDT -----  Contact: Elise ( spouse ) @  [phone]  Caller is calling to get the referral for Speech Therapy faxed to: [facility] @ [phone] AttN: Maricel

## 2018-07-11 NOTE — TELEPHONE ENCOUNTER
Called to informed wife orders has been faxed for ST. Ask to call back on direct line.     Vanesa   HD Clinic Coordinator

## 2018-08-01 ENCOUNTER — TELEPHONE (OUTPATIENT)
Dept: NEUROLOGY | Facility: CLINIC | Age: 68
End: 2018-08-01

## 2018-08-01 NOTE — TELEPHONE ENCOUNTER
----- Message from Mika Mendenhall sent at 7/31/2018 11:28 AM CDT -----  Needs Advice    Reason for call: Mrs. Godfrey is calling to schedule a f/u appt, per the doctor's request, to checkup on medication.  Communication Preference: Mrs. Godfrey (wife) @ [phone]  Additional Information:

## 2018-10-26 ENCOUNTER — TELEPHONE (OUTPATIENT)
Dept: NEUROLOGY | Facility: CLINIC | Age: 68
End: 2018-10-26

## 2018-10-26 NOTE — TELEPHONE ENCOUNTER
----- Message from Carlene Lam sent at 10/26/2018 10:55 AM CDT -----  Contact: Elise (WIfe) [phone]  Needs Advice    Reason for call: Elise called to speak to someone regarding scheduling the patient's appointment. Please contact the patient to discuss further.          Communication Preference:PHONE     Additional Information:

## 2018-11-26 ENCOUNTER — TELEPHONE (OUTPATIENT)
Dept: NEUROLOGY | Facility: CLINIC | Age: 68
End: 2018-11-26

## 2018-11-26 NOTE — TELEPHONE ENCOUNTER
Confirmed with pt's wife Belen, pt will be attending HD clinic appt 11/27 @9am          ELISE BALDWIN

## 2018-11-27 ENCOUNTER — OFFICE VISIT (OUTPATIENT)
Dept: NEUROLOGY | Facility: CLINIC | Age: 68
End: 2018-11-27
Payer: MEDICARE

## 2018-11-27 VITALS
BODY MASS INDEX: 27.96 KG/M2 | HEIGHT: 70 IN | SYSTOLIC BLOOD PRESSURE: 123 MMHG | WEIGHT: 195.31 LBS | HEART RATE: 66 BPM | DIASTOLIC BLOOD PRESSURE: 80 MMHG

## 2018-11-27 DIAGNOSIS — G10 HUNTINGTON DISEASE: Primary | ICD-10-CM

## 2018-11-27 PROCEDURE — 99999 PR PBB SHADOW E&M-EST. PATIENT-LVL II: CPT | Mod: PBBFAC,,,

## 2018-11-27 PROCEDURE — 99215 OFFICE O/P EST HI 40 MIN: CPT | Mod: S$PBB,,, | Performed by: PSYCHIATRY & NEUROLOGY

## 2018-11-27 PROCEDURE — 99212 OFFICE O/P EST SF 10 MIN: CPT | Mod: PBBFAC

## 2018-11-27 RX ORDER — CITALOPRAM 20 MG/1
TABLET, FILM COATED ORAL
COMMUNITY
Start: 2018-11-13 | End: 2019-07-08

## 2018-11-27 NOTE — PROGRESS NOTES
"Name: Filiberto Godfrey  MRN: [id number]   CSN: [id number]      Date: 11/27/2018    Referring physician:  James Lomeli MD  [address] [place] [address] Dr Larson [address] [place] [phone]    Chief Complaint / Interval History:  - still moving head backwards, hits it and says it "feels good"  - grunts and huffs with tic-ing  - seeing Dr. Rehana Li in [facility] in [place] now - will reach out to her - last appt in Sept.  Took him off the Zyprexa and now on 18-18 [place]  - more issues with urinary leakage, and Urologist found a "speck" - going to be cleared by Cardiology   - more issues with walking, talking, argumentative, obstinate  - movements generally a little worse  - some weight loss  - more tone  - some extensor posturing  - more thirst, drinking up to 32   - also started celexa,   -    From July:  - generally less aware  - more swallow issues  - fall risk  - willing to wiggle meds  - not traveling 'for nothing"      Last seen 6/6/18:  - worsened speech and mouth movements that get in the way of communicating  - more grunting, more repeating phrases, some vocal tics, more tongue protrusiona nd bumping his head back, keeps his legs crossed and actually hits up against the underside of the table  - choking was a bad but now MUCH better (her words), rare cough after drinking  - a little mood instability  - constipated of and on   - some urinary urgency and cannot control it  - using finasteride 5 mg 2 months ago   - no LH or dizziness when standing  - loss of equilibrium doubled over 6 months     From FEb 2017:  - Accompanied by his wife  - Saw Dr. Baker who instructed Mr. Godfrey to stop his tetrabenazine  - He had a lot of grunting tics  - After being off TBZ for three days, his chorea got really bad  - Was started on gabapentin by Dr. Baker after which his chorea continued to worsen and he began having rapid shallow breathing    - He had trouble getting a follow up appointment with Dr. Baker so he saw a family " "doctor, at that time he was breathing at a rate of 30s to 40s  - Gabapentin was increased and he was recommended to start on olanzapine but did not start this   - His walking became bad with toe walking on his right side  - Having some gait festination and freezing   - He is choking on water when he swallows  - Has had personality changes where he is turning into more of a "walflower" and has been more quiet  - He is no longer able to shave safely  - having dribbling of urine   - Urine is dark and odorous    From last visit 8/25/16:   - started tbz at interdisciplinary visit, only at night.  Movements the same, gait is worsening.  Not s ure why they never went higher on dosing.  - moving next month to [place] - will plan to see Olivia  - more repeating self and memory  - feels sick and tired of this type of living" - mentions the frustration with the grunts and trunk movements      History of Present Illness (HPI):  64 yo with HD    Movements - initially developed leg movements, "couldn't sit still" affecting arms and legs eventually.  Misdiagnosed as RLS at that time.  But progressed to involve whole body movements.  Saw "some neurologist" in [place].  Then saw Dr. Sanders, he referred to Kavita at [facility].      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  2008 - invested $2.1 million, bad timing, but also some mismanagement from him that contributed to major losses.  Declared bankruptcy.     Mood - more irritable.  Tried Haldol for sleep about 2 years ago.  Now using Seroquel for sleep.  Restless at night, and gets up a lot at night.  Says he sleeps or lays down during the day to minimize the grunting.  On Wellbutrin/Klonopin/Tizanidine, unclear when or why prescribed.  Apparently he has trouble managing his medications, might be taking more klonopin during the day than prescribed.  Demonstrated recklessness in the past with gambling.  " No extramarital affairs.  Has depression and feels like he is worthless, cannot stand the growling/grunting.  Lots of worry about the future and now.    Generally good until  of this year.  Movements increased.  Now more vocal tics and grunting.  Contributing to irritability.    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  Chaotic then, said they lost his blood test twice...    Family history: Mother  at age 69 (had tongue thrust, impaired gait, and mood/memory changes), brother has symptoms (CAG 41), two maternal cousins (unk CAG repeats).  Has two unaffected sibs (to date, 1 bro, 1 sis).     30+ years, 2 sons (age 30, 32), 3 grandkids.       Living in [facility].      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 No  Falls (Musculoskeletal)?Yes - at risk  Cognitive impairment (Neurologic)?Yes  Psychoses (Psychiatric)?No - no hallucinations or delusions  Pain/Paresthesia (Neurologic)?No  Visual changes (Eyes)?No  Moles / skin changes (Skin)?No  Stridor / SOB (Pulm)?No  Bruising (Heme)?No    Past Medical History: The patient  has a past medical history of Diabetes mellitus, Heart disease, Memory loss, Mental disorder, and Movement disorder.    Social History: The patient  reports that  has never smoked. He does not have any smokeless tobacco history on file. He reports that he does not drink alcohol or use drugs.    Family History: Their family history is not on file.    Allergies: Patient has no known allergies.     Meds:   Current Outpatient Medications on File Prior to Visit   Medication Sig Dispense Refill    amlodipine (NORVASC) 10 MG tablet       aspirin (ECOTRIN) 81 MG EC tablet  Take 81 mg by mouth once daily.      atorvastatin (LIPITOR) 20 MG tablet Take 20 mg by mouth once daily.      buPROPion (WELLBUTRIN SR) 150 MG TBSR 12 hr tablet       cholecalciferol, vitamin D3, (VITAMIN D3) 1,000 unit capsule Take 1,000 Units by mouth once daily.      citalopram (CELEXA) 20 MG tablet       clonazePAM (KLONOPIN) 1 MG tablet 1 mg.       deutetrabenazine (AUSTEDO) 12 mg Tab Take by mouth 2 (two) times daily.      deutetrabenazine (AUSTEDO) 6 mg Tab Take 6 mg by mouth 2 (two) times daily. Take in conjunction to 9mg for a total of 15mg twice daily. 60 tablet 11    deutetrabenazine (AUSTEDO) 9 mg Tab Take 9 mg by mouth 2 (two) times daily. Take in conjunction with 6mg for total of 15mg twice a day. 60 tablet 11    docusate sodium (COLACE) 50 MG capsule Take by mouth 2 (two) times daily.      ferrous sulfate 325 mg (65 mg iron) Tab tablet Take 325 mg by mouth daily with breakfast.      finasteride (PROSCAR) 5 mg tablet 1 Tablet Once a day THANK YOU  3    lisinopril 10 MG tablet Take 10 mg by mouth.      omeprazole (PRILOSEC) 20 MG capsule Take 20 mg by mouth once daily.      ranitidine (ZANTAC) 150 MG tablet Take 150 mg by mouth.      senna-docusate 8.6-50 mg (PERICOLACE) 8.6-50 mg per tablet Take 1 tablet by mouth.      SITagliptan-metformin (JANUMET XR) 100-1,000 mg TM24 Take 1 tablet by mouth 2 (two) times daily with meals.      tamsulosin (FLOMAX) 0.4 mg Cp24 Take 0.4 mg by mouth.      tizanidine 4 mg Cap Take 4 mg by mouth once daily.      trazodone (DESYREL) 50 MG tablet Take 50 mg by mouth every evening.      duloxetine (CYMBALTA) 20 MG capsule Take 20 mg by mouth.      gabapentin (NEURONTIN) 300 MG capsule Take 300 mg by mouth.      isosorbide mononitrate (IMDUR) 30 MG 24 hr tablet Take 20 mg by mouth once daily.      meclizine (ANTIVERT) 25 mg tablet       nitroGLYCERIN (NITROSTAT) 0.4 MG SL tablet Place 0.4 mg under the tongue every 5 (five) minutes as needed for Chest  "pain.      olanzapine (ZYPREXA) 5 MG tablet Take 5 mg by mouth every evening.      ondansetron (ZOFRAN-ODT) 4 MG TbDL       oxybutynin (DITROPAN-XL) 10 MG 24 hr tablet Take 10 mg by mouth.       No current facility-administered medications on file prior to visit.        Exam:  /80   Pulse 66   Ht 5' 10" (1.778 m)   Wt 88.6 kg (195 lb 5.2 oz)   BMI 28.03 kg/m²     * Specialized movement exam  No hypophonic speech.    No facial masking.   No cogwheel rigidity.     + mild B bradykinesia.   No tremor with rest, posture, kinesis, or intention.    See UHDRS below.  Includes chorea and grunting tics.   No myoclonus.   No motor impersistence.   Wide-based gait.   No shortened stride length.  + extensor abnormal arm swing.     ++ postural instability.      UHDRS Chorea Score    Face 2   NITIN 2   Trunk 2   RUE 2   LUE 2   RLE 2   LLE 2       TOTAL 14       0 Absent  1 Slight/intermittent  2 Mild/common or moderate/intermittent  3 Moderate/common  4  Marked/prolonged    Laboratory/Radiological:  - Results:  No visits with results within 3 Month(s) from this visit.   Latest known visit with results is:   Lab Visit on 02/23/2017   Component Date Value Ref Range Status    Specimen UA 02/23/2017 Urine, Unspecified   Final    Color, UA 02/23/2017 Yellow  Yellow, Straw, Khushi Final    Appearance, UA 02/23/2017 Clear  Clear Final    pH, UA 02/23/2017 5.0  5.0 - 8.0 Final    Specific Gravity, UA 02/23/2017 1.015  1.005 - 1.030 Final    Protein, UA 02/23/2017 Negative  Negative Final    Glucose, UA 02/23/2017 Negative  Negative Final    Ketones, UA 02/23/2017 Negative  Negative Final    Bilirubin (UA) 02/23/2017 Negative  Negative Final    Occult Blood UA 02/23/2017 Negative  Negative Final    Nitrite, UA 02/23/2017 Negative  Negative Final    Urobilinogen, UA 02/23/2017 Negative  <2.0 EU/dL Final    Leukocytes, UA 02/23/2017 Negative  Negative Final     - Independent review of images: "     Diagnoses:          1) HD, moderate in severity at this time.  WORSE  2) Depression, contracts today for safety    Medical Decision Makin) Consider lwoering austedo to 15-15  2) then would add amantadine 100 bid to make up the difference, help gait/fatigue  3) not convinced the ssri is helping much, might trial snri  4) needs [facility]  5) more pt/ot --> focus on ST for speech.swallow          Xavi Porter MD, MPH  Division of Movement and Memory Disorders  [facility]

## 2019-06-10 ENCOUNTER — TELEPHONE (OUTPATIENT)
Dept: NEUROLOGY | Facility: CLINIC | Age: 69
End: 2019-06-10

## 2019-06-10 NOTE — TELEPHONE ENCOUNTER
----- Message from Carlene Lam sent at 6/10/2019 12:33 PM CDT -----  Contact: Elise (Wife) [phone]  Needs Advice    Reason for call: Elise called to speak to someone regarding scheduling the patient to be seen this week.        Communication Preference: PHONE    Additional Information:

## 2019-06-11 ENCOUNTER — TELEPHONE (OUTPATIENT)
Dept: NEUROLOGY | Facility: CLINIC | Age: 69
End: 2019-06-11

## 2019-06-11 NOTE — TELEPHONE ENCOUNTER
----- Message from Mika Mendenhall sent at 6/11/2019 10:02 AM CDT -----  Patient Returning Call from [facility]    Who Left Message for Patient: Vanesa CUELLAR  Communication Preference: Mrs. Godfrey (wife) @ [phone]  Additional Information:

## 2019-06-12 NOTE — TELEPHONE ENCOUNTER
Returned call and spoke to pt's wife for an add on for July 8 at 4:20pm.     Vanesa   HD Clinic Coordinator

## 2019-07-08 ENCOUNTER — OFFICE VISIT (OUTPATIENT)
Dept: NEUROLOGY | Facility: CLINIC | Age: 69
End: 2019-07-08
Payer: MEDICARE

## 2019-07-08 VITALS
BODY MASS INDEX: 27.75 KG/M2 | DIASTOLIC BLOOD PRESSURE: 85 MMHG | SYSTOLIC BLOOD PRESSURE: 128 MMHG | HEART RATE: 97 BPM | WEIGHT: 193.81 LBS | HEIGHT: 70 IN

## 2019-07-08 DIAGNOSIS — G10 HUNTINGTON DISEASE: Primary | ICD-10-CM

## 2019-07-08 PROCEDURE — 99999 PR PBB SHADOW E&M-EST. PATIENT-LVL V: CPT | Mod: PBBFAC,,, | Performed by: PSYCHIATRY & NEUROLOGY

## 2019-07-08 PROCEDURE — 99215 OFFICE O/P EST HI 40 MIN: CPT | Mod: PBBFAC | Performed by: PSYCHIATRY & NEUROLOGY

## 2019-07-08 PROCEDURE — 99999 PR PBB SHADOW E&M-EST. PATIENT-LVL V: ICD-10-PCS | Mod: PBBFAC,,, | Performed by: PSYCHIATRY & NEUROLOGY

## 2019-07-08 PROCEDURE — 99215 OFFICE O/P EST HI 40 MIN: CPT | Mod: S$PBB,,, | Performed by: PSYCHIATRY & NEUROLOGY

## 2019-07-08 PROCEDURE — 99215 PR OFFICE/OUTPT VISIT, EST, LEVL V, 40-54 MIN: ICD-10-PCS | Mod: S$PBB,,, | Performed by: PSYCHIATRY & NEUROLOGY

## 2019-07-08 RX ORDER — METHYLPHENIDATE HYDROCHLORIDE 5 MG/1
5 TABLET ORAL DAILY
Qty: 30 TABLET | Refills: 0 | Status: SHIPPED | OUTPATIENT
Start: 2019-07-08 | End: 2022-07-08

## 2019-07-08 RX ORDER — QUETIAPINE FUMARATE 25 MG/1
12.5 TABLET, FILM COATED ORAL
COMMUNITY
Start: 2019-07-01

## 2019-07-08 NOTE — PATIENT INSTRUCTIONS
Medical Decision Makin) increase the seroquel to 12.5 BID  2) glycopyrrolate for drooling, botox will be ordered  3) austedo 18 BID  4) will trial ritalin for wakefulness during the day  5) palliative care consult  6) HD clinic on         Xavi Porter MD, MPH  Division of Movement and Memory Disorders  [facility]

## 2019-07-11 ENCOUNTER — TELEPHONE (OUTPATIENT)
Dept: NEUROLOGY | Facility: CLINIC | Age: 69
End: 2019-07-11

## 2019-07-11 NOTE — TELEPHONE ENCOUNTER
Below is e-mail communication follow up from visit on Monday with Dr. Porter. Case discussed with ELISE Cancino  HD Clinic Coordinator     Good barbie Bright and Bolivar,     Here are some answer to your questions below:       Wheelchair   Order for wheelchair has been placed and awaiting for Dr. Porter to sign. Once the order is signed, order will be sent to a durable medical equipment company to be services. This will be someone close to home and place of service of place where insurance company will pay for wheelchair    Tools to help  Bathing- Order a bath/shower chair to keep from falling in tub and also so your mom will not have to put more pressure on her back. Also, the use of someone coming into the home to help with bath  Dressing- To help with dressing, we can order Occupational therapy. An occupational therapist can work on alternatives to help with assisting with dressing and safety.   Communication Board or Device- Need to be evaluated by a speech therapist whom specializes in communication devices. ( Order has been placed) Speech Therapist would help find the right device for your father    Special Bed/Chairs-  Is your father falling out of the current bed? Does he sleep in a hospital bed? Is he having any trouble getting in/out of bed?     Convention  The above answers above cover a lot of what was discuss and needed for HD patients. Just came in mind, this can have a many different stages of HD.    Stages of HD  I know you had some questions about different stages of HD. I have provided a little information site here, with some information on different stages. I try to find one which had a little more break down.   [email or web address]    Clinical Trails  The are many clinical trails in different stages. We are awaiting for some clinical trails here, but there are some going on in other areas. We have to make sure guidelines are meet, in  order to qualify. I will follow up with Dr. Porter on any trails he may think your father my be able to meet qualifications.     [facility]  When would be a goof time for Julita and I to set up a visit with you? Let me know I can set up via [employer]..     Palliative Care  We, Dr. Porter, Julita, and I are working with team to make sure we can get services in [facility] scheduled.    I hope this answers some of your questions, Julita and I will be in touch with you and your family. We are here to help ever step of the way.       Carlos Alberto Ruiz  [facility] Coordinator   [facility]  [facility] ABLE-Communication Chair     Department of Neurology and Movement Disorders   [address] [place] [id number]  [phone] Office(Direct Line) [phone] Fax    [email or web address]  [email or web address]     We are treating human beings who are ill, rather than treating a disease. If we forget the individual; we are likely to fail. -[facility]           From: JUSTIN Godfrey <[email or web address]>   Sent: Tuesday, July 09, 2019 9:56 AM  To: [facility] <[email or web address]>  Cc: Bolivar Godfrey <[email or web address]>  Subject: [EXTERNAL] Filiberto Godfrey Pallative Care    Vanesa,    Hope your day is going well.  My father was placed on palliative care yesterday by Dr. Porter.  He told us to follow up with you on the following items:     Follow up to get wheelchair ordered   Ask about any tools that could help him bathe, walk, get dressed, communication board, any special chairs/beds, etc   Ask her if she saw anything at the convention that could help with anything??   Ask about any clinical trials   Ask about [email or web address] meera    My main concern is does [facility] have a Palliative Care option in [facility] where he lives? Feel free to contact me at [phone]. I've also cc'd my brother  Bolivar Godfrey on this email. His number is [phone].    Thanks in advance,  Deangelo Godfrey  [phone]

## 2019-07-25 ENCOUNTER — TELEPHONE (OUTPATIENT)
Dept: NEUROLOGY | Facility: CLINIC | Age: 69
End: 2019-07-25

## 2019-07-25 NOTE — TELEPHONE ENCOUNTER
VM from patients wife and would like to explained more on palliative care. Asking for a call back.

## 2019-07-29 ENCOUNTER — TELEPHONE (OUTPATIENT)
Dept: NEUROLOGY | Facility: CLINIC | Age: 69
End: 2019-07-29

## 2019-07-29 NOTE — TELEPHONE ENCOUNTER
"1:12PM  Vm message left for pt's wife regarding referral to [facility] for Palliative Care.  SW also wanted to f/u to answer any questions she may have head regarding Palliative care.  SW also inquired if she was in need of the bath/shower chair and w/c for home use as indicated by recommendations (see Vanesa note dated 7/11/19).        10:16AM  Case f/u regarding"VM from patients wife and would like to explained more on palliative care. Asking for a call back."  Sw spoke with difference between hh, palliative care and hospice.  Discussed Community Choice Waiver as well.  Advised pt's wife Elise, for ccw,  must qualify for Medicaid to be eligible to receive services.  Elise verbalized understanding.    Elise indicated she had an appt with her  To discuss Palliative Care to understand better what services they provide.  CRISTHIAN reiterated the differences in efforts to give Elise more information as well as information to job her thoughts for questions to ask.  Elise indicated she was a nurse and can provide any nursing care that pt may require.  Elise indicated she has had some health ailments (back problems) and she is very limited with assisting with ADLs.  CRISTHIAN verbalized understanding.    CRISTHIAN emailed Elise information regarding CCW/application, SW also alerted Elise burton email, of referral to Pall[facility] care company[facility]  as there was an Md order for services.  CRISTHIAN encouraged her to discuss her needs with them to determine if services would be suitable for pt/spouse.      Md order received for Palliative care.  Referral made to [facility] (P) [phone] (F) [phone] AIM program/palliative program.         ELISE BALDWIN  "

## 2019-07-30 ENCOUNTER — TELEPHONE (OUTPATIENT)
Dept: NEUROLOGY | Facility: CLINIC | Age: 69
End: 2019-07-30

## 2019-07-30 NOTE — TELEPHONE ENCOUNTER
"1:32PM  Pt's wife Elise made aware Dr. Porter is out of the office at this time.  SW asked if she felt the growling was life threatening and/or if pt was unable to breath while making the growling sound.  Pt's wife indicated it was not that serious and she would f/u with her local neurologist.  SW verbalized understanding.         11:01AM  Pt's wife reports pt has been growling since starting Ritalin.  Sw inquired if it was a "wet" cough which could sound like a growl as if he was trying to clear secretions????    Pt's wife indicated no,,, it's a growl.   SW verbalized understanding.    SW informed elma Olivares alert Carlos Alberto and Dr. Porter.         ELISE BALDWIN   "

## 2019-07-30 NOTE — TELEPHONE ENCOUNTER
1:25PM  Md order for wheelchair received.  Referral made to [facility]  (P) [phone] (F) [phone]/[place].  Faxed face sheet md order with most recent dm clinic note.  DME co to reach out to pt's wife to coordinate delivery time/date.  Pt's wife notified of the above and in agreement.  No other needs verbalized.      10:52AM  Returned call received from pt's wife Elise who indicated she was going to meet with [facility]  today to discuss services.  SW inquired if she had any questions regarding Palliative care?  Elise indicated not at this time.  SW inquired about dme (shower chair and w/c for home use).  Elise indicated a w/c for home use would be helpful, pt already has a shower chair.  Sw inquired if she had an agency/dme provider preference?  Elise indicated she didn't have an agency preference.  SW informed her will f/u and let her know who she should expect a call from.             10:19AM  Vm message left this morning for pt's wife regarding referral to [facility] for Palliative Care.  SW also wanted to f/u to answer any questions she may have head regarding Palliative care.  SW also inquired if she was in need of the bath/shower chair and w/c for home use as indicated by recommendations (see Vanesa note dated 7/11/19).  SW requested return call.      ELISE BALDWIN

## 2019-08-05 ENCOUNTER — TELEPHONE (OUTPATIENT)
Dept: NEUROLOGY | Facility: CLINIC | Age: 69
End: 2019-08-05

## 2019-08-07 ENCOUNTER — TELEPHONE (OUTPATIENT)
Dept: NEUROLOGY | Facility: CLINIC | Age: 69
End: 2019-08-07

## 2019-08-07 NOTE — TELEPHONE ENCOUNTER
Incoming message from pt's wife. She would like a call back from Julita, but left the following information.   [facility] has came and will provide PT and ST for 1 month. Also, she states they do not need bed side comonde yet, but will take a wheelchair. They have not been contacted about the wheelchair. ( We will follow up on wheelchair order.     Patient's wife had no further questions.     Vanesa  HD Clinic Coordinator

## 2019-08-08 ENCOUNTER — TELEPHONE (OUTPATIENT)
Dept: NEUROLOGY | Facility: CLINIC | Age: 69
End: 2019-08-08

## 2019-08-08 NOTE — TELEPHONE ENCOUNTER
Case f/u regarding w/c.  W/C referral sent to [facility] (P) [phone] (F) [phone]/ [facility].  Spoke with [facility] this morning who indicated pt/family will be contacted today to coordinate delivery time/date.      SW notified pt's wife Elise of the above.  She verbalized understanding and agreement.  Sw discussed meet with [facility] for in home services.  SW confirmed pt/spouse has elected Palliative care.  Will have pt/st x 1 month.  Pt's wife verbalized no further questions/concerns at this time.  SW encouraged call to clinic should needs arise.        ELISE BALDWIN

## 2019-09-06 ENCOUNTER — TELEPHONE (OUTPATIENT)
Dept: NEUROLOGY | Facility: CLINIC | Age: 69
End: 2019-09-06

## 2019-09-06 NOTE — TELEPHONE ENCOUNTER
Spoke with pt's wife Elise to confirm pt will be in attendance for upcoming HD Clinic appt 9/10/19 @ 8am.  SW left  w/ updated address for [place] location.  No other needs verbalized at this time.      ELISE BALDWIN

## 2019-09-10 ENCOUNTER — HOSPITAL ENCOUNTER (OUTPATIENT)
Dept: RADIOLOGY | Facility: HOSPITAL | Age: 69
Discharge: HOME | End: 2019-09-10
Attending: PSYCHIATRY & NEUROLOGY
Payer: MEDICARE

## 2019-09-10 ENCOUNTER — OFFICE VISIT (OUTPATIENT)
Dept: NEUROLOGY | Facility: CLINIC | Age: 69
End: 2019-09-10
Payer: MEDICARE

## 2019-09-10 ENCOUNTER — HOSPITAL ENCOUNTER (OUTPATIENT)
Dept: CARDIOLOGY | Facility: CLINIC | Age: 69
Discharge: HOME | End: 2019-09-10
Payer: MEDICARE

## 2019-09-10 DIAGNOSIS — Z51.5 PALLIATIVE CARE BY SPECIALIST: ICD-10-CM

## 2019-09-10 DIAGNOSIS — G10 HUNTINGTON DISEASE: Primary | ICD-10-CM

## 2019-09-10 DIAGNOSIS — R29.6 REPEATED FALLS: ICD-10-CM

## 2019-09-10 DIAGNOSIS — R41.841 COGNITIVE COMMUNICATION DEFICIT: ICD-10-CM

## 2019-09-10 DIAGNOSIS — Z74.09 IMPAIRED FUNCTIONAL MOBILITY, BALANCE, GAIT, AND ENDURANCE: ICD-10-CM

## 2019-09-10 DIAGNOSIS — G10 HUNTINGTON DISEASE: ICD-10-CM

## 2019-09-10 DIAGNOSIS — R47.1 DYSARTHRIA: ICD-10-CM

## 2019-09-10 DIAGNOSIS — R53.1 DECREASED STRENGTH: ICD-10-CM

## 2019-09-10 PROCEDURE — 99499 UNLISTED E&M SERVICE: CPT | Mod: S$PBB,,, | Performed by: PSYCHIATRY & NEUROLOGY

## 2019-09-10 PROCEDURE — 99497 ADVNCD CARE PLAN 30 MIN: CPT | Mod: S$PBB,,, | Performed by: INTERNAL MEDICINE

## 2019-09-10 PROCEDURE — 99499 NO LOS: ICD-10-PCS | Mod: S$PBB,,, | Performed by: PSYCHIATRY & NEUROLOGY

## 2019-09-10 PROCEDURE — 93010 EKG 12-LEAD: ICD-10-PCS | Mod: S$PBB,,, | Performed by: INTERNAL MEDICINE

## 2019-09-10 PROCEDURE — 70450 CT HEAD/BRAIN W/O DYE: CPT | Mod: TC

## 2019-09-10 PROCEDURE — 93010 ELECTROCARDIOGRAM REPORT: CPT | Mod: S$PBB,,, | Performed by: INTERNAL MEDICINE

## 2019-09-10 PROCEDURE — 99497 ADVNCD CARE PLAN 30 MIN: CPT | Mod: PBBFAC,59 | Performed by: INTERNAL MEDICINE

## 2019-09-10 PROCEDURE — 97163 PT EVAL HIGH COMPLEX 45 MIN: CPT | Mod: PO | Performed by: PHYSICAL THERAPIST

## 2019-09-10 PROCEDURE — 99498 ADVNCD CARE PLAN ADDL 30 MIN: CPT | Mod: PBBFAC | Performed by: INTERNAL MEDICINE

## 2019-09-10 PROCEDURE — 70450 CT HEAD/BRAIN W/O DYE: CPT | Mod: 26,,, | Performed by: RADIOLOGY

## 2019-09-10 PROCEDURE — 97165 OT EVAL LOW COMPLEX 30 MIN: CPT | Mod: PO

## 2019-09-10 PROCEDURE — 99497 PR ADVNCD CARE PLAN 30 MIN: ICD-10-PCS | Mod: S$PBB,,, | Performed by: INTERNAL MEDICINE

## 2019-09-10 PROCEDURE — 99498 PR ADVNCD CARE PLAN ADDL 30 MIN: ICD-10-PCS | Mod: S$PBB,,, | Performed by: INTERNAL MEDICINE

## 2019-09-10 PROCEDURE — 70450 CT HEAD WITHOUT CONTRAST: ICD-10-PCS | Mod: 26,,, | Performed by: RADIOLOGY

## 2019-09-10 PROCEDURE — 99498 ADVNCD CARE PLAN ADDL 30 MIN: CPT | Mod: S$PBB,,, | Performed by: INTERNAL MEDICINE

## 2019-09-10 PROCEDURE — 99999 PR PBB SHADOW E&M-EST. PATIENT-LVL II: ICD-10-PCS | Mod: PBBFAC,,,

## 2019-09-10 PROCEDURE — 92522 EVALUATE SPEECH PRODUCTION: CPT | Mod: PO

## 2019-09-10 PROCEDURE — 99999 PR PBB SHADOW E&M-EST. PATIENT-LVL II: CPT | Mod: PBBFAC,,,

## 2019-09-10 PROCEDURE — 99212 OFFICE O/P EST SF 10 MIN: CPT | Mod: PBBFAC,25

## 2019-09-10 PROCEDURE — 93005 ELECTROCARDIOGRAM TRACING: CPT | Mod: PBBFAC | Performed by: INTERNAL MEDICINE

## 2019-09-10 NOTE — PROGRESS NOTES
"Consult Note  Palliative Care      Consult Requested By: Dr. Xavi Porter  Reason for Consult: Discuss advance care planning and goals of care.      ASSESSMENT/PLAN:     Plan/Recommendations:  Filiberto was seen today for ot initial evaluation.    Diagnoses and all orders for this visit:    Froy disease  -     CT Head Without Contrast; Future  -     EKG 12-lead; Future    Repeated falls  -     CT Head Without Contrast; Future    Palliative care by specialist  Advance care planning and goals of care conversation documented with patient and wife.  Wife needs caregiver support.        Ethical / Legal Advance Care Planning      - surrogate decision maker: Name: Belen Godfrey, Relationship: wife.   - Code Status: full for now. Wants fixable things fixed. Would want PCM if bradycardia is life limiting. Would not want to be intubated unless "curable pneumonia". She is not happy with son who wants him to be a DNR now. She thinks he still has some enjoyment to his life.    - [place]    - other advance directive: Son has the documents but wife does not have copies. Capacity to make medical decisions: unclear, Conflicts:  Wife and sons apparently have some disagreement but wife is decision maker. Sons have NOT been tested and at this time are not having any suspicious symptoms.           SUBJECTIVE:     History of Present Illness:   Patient is a 68 y.o. year old male presenting with HD . Patient had a fall 4 days ago and hit his ear/head. Seen by Dr. Porter-CT head and ECG ordered(drugs with QT prolongation).    Past Medical History:   Diagnosis Date    Diabetes mellitus     Heart disease     Memory loss     Mental disorder     Movement disorder      Past Surgical History:   Procedure Laterality Date    ANGIOPLASTY  1992     History reviewed. No pertinent family history.  Review of patient's allergies indicates:  No Known Allergies    Medications:    Current Outpatient Medications:     amlodipine (NORVASC) 10 " MG tablet, , Disp: , Rfl:     aspirin (ECOTRIN) 81 MG EC tablet, Take 81 mg by mouth once daily., Disp: , Rfl:     atorvastatin (LIPITOR) 20 MG tablet, Take 20 mg by mouth once daily., Disp: , Rfl:     cholecalciferol, vitamin D3, (VITAMIN D3) 1,000 unit capsule, Take 1,000 Units by mouth once daily., Disp: , Rfl:     clonazePAM (KLONOPIN) 1 MG tablet, 1 mg. , Disp: , Rfl:     deutetrabenazine (AUSTEDO) 9 mg Tab, Take 9 mg by mouth 2 (two) times daily. Take in conjunction with 6mg for total of 15mg twice a day., Disp: 60 tablet, Rfl: 11    docusate sodium (COLACE) 50 MG capsule, Take by mouth 2 (two) times daily., Disp: , Rfl:     duloxetine (CYMBALTA) 20 MG capsule, Take 20 mg by mouth., Disp: , Rfl:     ferrous sulfate 325 mg (65 mg iron) Tab tablet, Take 325 mg by mouth daily with breakfast., Disp: , Rfl:     finasteride (PROSCAR) 5 mg tablet, 1 Tablet Once a day THANK YOU, Disp: , Rfl: 3    gabapentin (NEURONTIN) 300 MG capsule, Take 300 mg by mouth., Disp: , Rfl:     isosorbide mononitrate (IMDUR) 30 MG 24 hr tablet, Take 20 mg by mouth once daily., Disp: , Rfl:     lisinopril 10 MG tablet, Take 10 mg by mouth., Disp: , Rfl:     meclizine (ANTIVERT) 25 mg tablet, , Disp: , Rfl:     methylphenidate HCl (RITALIN) 5 MG tablet, Take 1 tablet (5 mg total) by mouth once daily., Disp: 30 tablet, Rfl: 0    nitroGLYCERIN (NITROSTAT) 0.4 MG SL tablet, Place 0.4 mg under the tongue every 5 (five) minutes as needed for Chest pain., Disp: , Rfl:     omeprazole (PRILOSEC) 20 MG capsule, Take 20 mg by mouth once daily., Disp: , Rfl:     ondansetron (ZOFRAN-ODT) 4 MG TbDL, , Disp: , Rfl:     oxybutynin (DITROPAN-XL) 10 MG 24 hr tablet, Take 10 mg by mouth., Disp: , Rfl:     QUEtiapine (SEROQUEL) 25 MG Tab, 12.5 mg. , Disp: , Rfl:     ranitidine (ZANTAC) 150 MG tablet, Take 150 mg by mouth., Disp: , Rfl:     senna-docusate 8.6-50 mg (PERICOLACE) 8.6-50 mg per tablet, Take 1 tablet by mouth., Disp: , Rfl:      tamsulosin (FLOMAX) 0.4 mg Cp24, Take 0.4 mg by mouth., Disp: , Rfl:     tizanidine 4 mg Cap, Take 4 mg by mouth once daily., Disp: , Rfl:     trazodone (DESYREL) 50 MG tablet, Take 50 mg by mouth every evening., Disp: , Rfl:       24h Oral Morphine Equivalents (OME): 0    OBJECTIVE:   Symptom Assessment (ESAS 0-10 scale)     ESAS 0 1 2 3 4 5 6 7 8 9 10   Pain x             Dyspnea x             Anxiety x             Nausea x             Depression  x             Anorexia x             Fatigue x             Insomnia x             Restlessness  x             Agitation x             Constipation    no  Bowel Management Plan (BMP): yes  Diarrhea        no  Comments:   Performance Status: PPS Score 50  ROS:  Review of Systems   Constitutional: Negative.    HENT: Positive for drooling and trouble swallowing.         Cautions to eat slower.    Eyes: Negative.    Respiratory: Negative.    Cardiovascular: Negative.         Had some slow heart beat recently.   Gastrointestinal: Negative.    Endocrine: Negative.    Genitourinary: Negative.         Incontinent at night mostly.   Musculoskeletal: Negative.    Skin: Negative.    Allergic/Immunologic: Negative.    Neurological: Positive for speech difficulty and weakness.   Hematological: Negative.    Psychiatric/Behavioral: Positive for confusion.        Slow speech/some confusion       Physical Exam:  Vitals:    Physical Exam    Labs:  CBC:   No results found for: WBC  No results found for: HGB  No results found for: HCT  No results found for: MCV  No results found for: PLT        LFT: No results found for: AST, GGT, ALKPHOS, BILITOT    Albumin: No results found for: ALBUMIN  Protein: No results found for: PROT    Radiology:None    Psychosocial/Cultural/Spiritual: Has friends that he used to play golf with. One comes by weekly and takes him for rides    F- Mitzi and [employer]     I - Importance yes but they do not come around or help. She says they are aware of his  diagnosis.  .  C - Community yes    A - Address in Care not      50 min visit spent in chart review, face to face discussion of goals of care,  symptom assessment, coordination of care and emotional support. Advance care planning discussion held and documented.    Signature: Idalmis Sinha MD

## 2019-09-10 NOTE — PROGRESS NOTES
[facility]    SPEECH THERAPY NEUROLOGICAL REHABILITATION EVALUATION    HD MULTIDISCIPLINARY CLINIC    Date: 9/10/2019     Start Time:  1200  Stop Time:  1215    Procedure Min.   Speech Language Evaluation   15         Total Minutes: 15  Charges Billed/# of units: 1    HD Clinic: 1  Authorization Period: Eval Only   Plan of Care Expiration: Eval Only     History   Onset Date:  Dx 6 years ago, increased difficulty with communication and movement over last 6 months.   Primary Diagnosis: [place]'s Disease   Treatment Diagnosis:    Encounter Diagnoses   Name Primary?    Repeated falls     Froy disease Yes    Palliative care by specialist     Impaired functional mobility, balance, gait, and endurance     Decreased strength     Cognitive communication deficit     Dysarthria      Referring Provider: Dr. Porter  Orders: Ambulatory referral for Speech therapy evaluate and treat; Evaluation only  Past Medical History:   Past Medical History:   Diagnosis Date    Diabetes mellitus     Heart disease     Memory loss     Mental disorder     Movement disorder      History of Present Illness: Filiberto Godfrey  presents to the [facility] HD [facility]idisciplinary [facility] secondary to the diagnosis of Denver's Disease. Bladder CA in early 2019.   Subjective   Precautions: progressive degenerative disease, cognitive deficits  Prior Therapy:  Home health [facility]  Home Therapy at present time: No, d/c 2 wks ago  Pain: 0 /10  Nutrition:  regular with thin liquids, wife reported decreased appetite  Recent MBSS:  none   Social History:  Filiberto lives with his wife. He used to work in construction.   Motor Skills: see PT/OT report, frequent falls and most recent involving injury to head, ear, and shoulder  Vision: pt's eyes were droopy most of the time. When cued he looked at the clinician. Appeared WFL with no concerns reported.   Hearing: Appeared to be within  functional limits in conversation. Will monitor and refer as necessary.  Prior Level of Function:  independent   Changes of note from last HD clinic include: 1st clinic   Chief Complaint: communication decline per wife report  Objective   Pt's cognitive-linguistic, motor speech, fluency, and voice were informally assessed. OME performed within Cranial Nerve Assessment detailed below. Motor speech skills were assessed and were functional for daily communication with a variety of communication partners (i.e. Family, friends, medical personnel).     Respiration / Phonation:   Average Norms  /WFL Maximum Phon. Time (ah) Words Per Minute:   P^ 23 5.0-7.1 6  Trial 1: 6     DNT words per minute   T^ 6  4.8-7.1 1.2  Trial 2:  7 >125 = WFL    K^ 7  4.4-7.4 1.4   <125 = refer for AAC eval   P^T^K^ 4  3.6-7.5 .8  Av.5 Norm: 160-170  (paragraph reading)       Norm (F 10-26, M 13-34.6) Norm: 150 to 250 (norm conversation)      Phonation Oral Reading Conversation   Stimulus Sustained ah:    Singing up scale: DNT   [place] History and Conversation   Quality raspy DNT raspy, mildly imprecise articulation   Duration  6.5 seconds   Norm (F 10-26, M 13-34.6))  DNT N/a   Loudness  reduced and monoloudness  reduced and monoloudness   Steadiness varied pitch  varied pitch     Overall Speech Intelligibility: good when he speaks.  With mild imprecise articulation and raspy vocal quality. Vocal intensity was reduced.    Cognitive-Linguistic Assessment:   Auditory Comprehension: Informally assessed. Pt inconsistently responded to simple questions and followed simple commands.     Reading Comprehension: Did not assess but no concerns were reported.     Verbal Expression: Pt spoke very little and when he did, he spoke in short, simple phrases.  Wife reported that he does not speak very much and she just anticipates what he needs. No consistent communication system was established.     Written Expression: Did not assess but  no concerns were reported.     Cognition: Informally assessed and cognition was reported to be impaired. He kept sticking out his tongue. His wife takes care of all ADL's and household tasks.      Augmentative/Alternative Communication (AAC): Pt is not currently using an augmentative/ alternative communication device. He  would benefit from a full AAC evaluation given current motor speech and voice skills and progressive nature of HD to establish a simple communication system.   Type of AAC Device:  n/a   Recommend AAC Eval: yes for simple communication system      Clinical Swallow Exam/ Mere Mechanism Exam:  Mandibular Strength and Mobility - Trigeminal Nerve (CNV) Rest: WFL   Lateralization: pt could not perform  Protrusion: pt could not perform  Retraction:  pt could not perform  Involuntary Movement: absent    Oral Labial Strength and Mobility -Facial Nerve (CN VII)  Rest: WFL   Lateralization: reduced  Protrusion: reduced  Retraction:  reduced  Involuntary Movement: absent    Lingual Strength and Mobility- Hypoglossal Nerve (CN XII)  Rest: sticks out stuck frequently   Lateralization: reduced  Protrusion: reduced  Elevation:  reduced  Involuntary Movement: absent    Velar Elevation - Glossopharyngeal Nerve (CN IX) and Vagus (CN X) Rest: WFL   Symmetry: WFL   Elevation: reduced   Sustained elevation: reduced   Involuntary movement: absent     Buccal Strength and Mobility - Facial Nerve (CN VII)  reduced    Facial Sensation and Movement - Facial Nerve (CN VII) Symmetrical at rest: yes  Wrinkle forehead: yes  Able to close eyes tightly: yes  Taste to Anterior 2/3 of Tongue: yes     Structure Abnormalities: no  Dentition: poor, brown color  Secretion Management: drooling present  Mucosal Quality: WFL  Volitional Cough: weak  Volitional Swallow: appeared WFL via palpation  Voice Prior to PO Intake: clear but reduced volume    Kaylyn Swallow Protocol/Clinical Swallow Examination:   Swallowing was not formally assessed  but no concerns were reported.    Eating Assessment Tool (EAT-10): DNT, pt could not adequately answer questions  Recommend MBSS: no    Hearing: Appeared to be within functional limits in conversation. Will monitor and refer as necessary.    Education   Patient and his family were educated on the recommendations, AAC and swallowing precautions. They verbalized understanding.     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 His deficits were characterized by imprecise consonants  and slow rate of speech and harsh vocal quality, monoloudness  and reduced loudness when he speaks. His wife reported that he does not speak much anymore and she anticipates what he needs. A basic communication system (I.e., yes/no ?'s, picture board, etc) was discussed to establish for increased functional communication.   His swallow was c/b WFL  Cognitive-linguistic deficits were characterized by deficits across the board per chart review and wife report.  Demonstrates impairments including limitations as described in the problem list. Positive prognostic factors include family support. Negative prognostic factors include progressive nature of disease and complex medical history. Barriers to progress include complex medical history and progressive nature of disease.     Pt's spiritual, cultural and educational needs considered and pt agreeable to plan of care and goals.    [place] National Outcome Measurement System (NOMS) and Functional Communication Measure (FCM):   Severity Modifier for Medicare G-Code:  Motor Speech  Current status: FCM: LEVEL 4: In simple structured conversation with familiar communication partners, the individual can produce simple words and phrases intelligibly. The individual usually requires moderate cueing in order to produce simple sentences intelligibly, although accuracy may vary.  - CK  at least 40% < 60% impaired, limited or restricted      Spoken Language Expression  Current status: FCM:  LEVEL 3 The communication partner must assume responsibility for structuring thecommunication exchange, and with consistent and moderate cueing, the individual canproduce words and phrases that are appropriate and meaningful in context.   - CL at least 60% < 80% impaired, limited or restricted    Rehab Potential: fair to guarded    Short Term Goals: to be established by treating SLP    Plan    Recommended Treatment Plan:  1. Follow up with [facility] in 6 months to 1 year.    2. Outpatient speech therapy to establish a simple consistent communication system.    Plan of care expiration: Eval Only     Other Recommendations: none at this time    ZACH Victor, CCC-SLP   9/10/2019

## 2019-09-10 NOTE — PROGRESS NOTES
"Pt is a 69 y/o m/w/m seen today in HD Clinic for first clinic visit.  Pt is resting.  Easy to arouse. Not participating in conversation.  Pt is verbal.  He can make needs known.  He tends to use short sentences.  Pt's wife Giselle Orlando" present to provide information.  Pt lives at home with his wife Elise who is also his responsible party/primary caregiver.  Couple have two children together Deangelo -lives in [place], [place] and Bolivar-lives in [place].  Pt also has two other children from previous marriage/relationship.  Per pt's wife, pt is still ambulatory.  He has had multiple falls.  Most recent 4-5 days ago sustained head injury.  CT scan and EKG scheduled today.  Pt's wife reports pt stopped talking roughly a month ago.  She reports his affect is flat.  She further explained pt will perk up around company for a little while but then fall asleep.  Per previous SW notes, SW informed pt's wife about the community choices waiver.  The waitlist is so long (well over 1 yr) applying maybe futile. Pt's wife indicated they had the  on Aging providing sitter service 1 every two weeks.  Their children all have families and can only offer very limited support.  Pt was dc'd from [facility]  (Palliative care[facility] roughly two weeks ago.  Pt's wife indicated she was a retired nurse and is capable of taking pt's blood pressure and monitoring his vitals.  She does not feel pt needs a nurse.  Pt would benefit from caregiver support in the home.  SW reiterated limited community support.  Offered sitter list.  Pt's wife indicated she had the sitter list.  Discussed better system for pt to get her attention.  Different types of bells to get her attention and life alert button.  Will reach out to [facility] to see if they can assist with life alert button.  CRISTHIAN inquired if pt had the hand strength to press/push life alert button and she felt he did.  CRISTHIAN will f/etta BALDWIN LMSW  "

## 2019-09-10 NOTE — PROGRESS NOTES
[facility] Occupational Therapy  Initial Neurological Evaluation     Date: 9/10/2019  Patient: Filiberto Godfrey  Chart Number: [id number]    Therapy Diagnosis:Impaired ADL  Physician: Charlotte  Physician Orders: eval and tx  Medical Diagnosis: [place]'s Disease  Evaluation Date: 9/10/2019  Visit # / Visits Authorized: 1    Precautions: Standard and Fall    Subjective     History of Current Condition: Dx 6 years ago, increased difficulty with communication and movement over last 6 months. Bladder CA in early 2019.     Involved Side: Bilateral  Dominant Side: Right  Surgical Procedure: none    Previous Therapy:  OT and PT that ended 2 weeks ago and only lasted about a month.     Patient's Goals for Therapy: decrease falls    Pain:  Pain Related Behaviors Observed: no , wife states he never complains about pain.   Occupation:  Retired construction    Functional Limitations/Social History:    Prior Level of Function:  I, decline in las t 6 months per wife  Current Level of Function:mod a for most tasks    Home/Living environment : lives with their spouse  Home Access: 1 step  DME: shower stool     Leisure: passive, watches TV    Driving: not active    Past Medical History/Physical Systems Review:     Past Medical History:  Filiberto Godfrey  has a past medical history of Diabetes mellitus, Heart disease, Memory loss, Mental disorder, and Movement disorder.    Past Surgical History:  Filiberto Godfrey  has a past surgical history that includes Angioplasty (1992).    Current Medications:  Filiberto has a current medication list which includes the following prescription(s): amlodipine, aspirin, atorvastatin, cholecalciferol (vitamin d3), clonazepam, deutetrabenazine, docusate sodium, duloxetine, ferrous sulfate, finasteride, gabapentin, isosorbide mononitrate, lisinopril, meclizine, methylphenidate hcl, nitroglycerin, omeprazole, ondansetron, oxybutynin, quetiapine, ranitidine, senna-docusate 8.6-50  mg, tamsulosin, tizanidine, and trazodone.    Allergies:  Review of patient's allergies indicates:  No Known Allergies       Objective     Cognitive Exam:  Oriented: Mild confusion, does not give accurate information for history.   Behaviors: reluctant to participate, flat affect  Follows Commands/attention: Inattentive  Communication: limited communication but understandable when he does speak. Very slow to respond.   Safety awareness/insight to disability: aware of diagnosis, treatment, and prognosis    Visual/Perceptual: does not make eye contact    Physical Exam:  Postural examination/scapula alignment: Rounded shoulder  Joint integrity: Firm end feeling  Skin integrity: Bruising of L arm and ear,   Edema: none   Palpation: no palpable tenderness    ROM WFL BUE. Strength questionable due to inability to hold contraction.   Bradykinetic. 9 hole peg test deferred due to slow movements. Pt. Having to use spoon to feed self and wife has to cut his food.     Gross motor coordination:   - JANEE (Rapid Alternating Movements): slow,  - Finger Flicks (coordination moving from digit flexion to digit extension): slow    Tone:  Modified Sabrina Scale:   0 - No increase in muscle tone    Sensation:  Filiberto  reports WNL per patient report    Balance:   Static Sit - GOOD-: Takes MODERATE challenges from all directions but inconsistently  Dynamic sit- GOOD-: Takes MODERATE challenges from all directions but inconsistently  Endurance Deficit: moderate                    Functional Status      Functional Mobility:  Bed mobility: Min A  Roll to left: Min A  Roll to right: Min A  Supine to sit: Min A  Sit to supine: Min A  Transfers to bed: Min A  Transfers to toilet: Min A  Car transfers: Min A  Wheelchair mobility: Pt. Has a used wheelchair but does not use.     ADL's:  Feeding: Mod I with spoon, wife cuts food.  Grooming: Mod A, not shaving  Hygiene: Mod A, difficulty with oral hygiene  UB Dressing: Mod A  LB Dressing: Mod  A  Toileting: Mod A  Bathing: Max A    IADL's:  Homecare: D  Cooking: D  Laundry: D  Yard work: D  Use of telephone: D  Money management: D  Medication management: D  Handwriting:Mod A  Technology Use:D    Comments: Wife states patient can poor self cup of coffee occasionally.     Home Exercises and Patient Education Provided    Education provided:   -role of OT, goals for OT, scheduling/cancellations, insurance limitations with patient.      Assessment     Filiberto Godfrey is a 68 y.o. male referred to outpatient occupational therapy and presents with a medical diagnosis of Froy's Disease, resulting in bradykinesia and demonstrates limitations as described in the chart below.     Plan of care discussed with patient: Yes  Pt's spiritual, cultural and educational needs considered and patient is agreeable to the plan of care and goals as stated below:     Anticipated Barriers for therapy: wife is     Medical Necessity is demonstrated by the following  Profile and History Assessment of Occupational Performance Level of Clinical Decision Making Complexity Score   Occupational Profile:   Filiberto Godfrey is a 68 y.o. male who lives with their spouse and is currently retired as contruction worker. Filiberto Godfrey has difficulty with  bathing, grooming and dressing  affecting his/her daily functional abilities. His/her main goal for therapy is ease of care.     Comorbidities:   cardiac    Medical and Therapy History Review:   Expanded               Performance Deficits    Physical:  Muscle Endurance  Gross Motor Coordination  Fine Motor Coordination    Cognitive:  Attention  Initiation  Communication    Psychosocial:    Family Support -wife is good support but she is elderly. Sons are uninvolved.      Clinical Decision Making:  moderate    Assessment Process:  Detailed Assessments    Modification/Need for Assistance:  Significant Modifications/Assistance    Intervention Selection:  Limited  Treatment Options       low  Based on PMHX, co morbidities , data from assessments and functional level of assistance required with task and clinical presentation directly impacting function.         Plan   Follow in HD clinic. No further OT required at this time.

## 2019-09-10 NOTE — PROGRESS NOTES
[facility]  Physical Therapy Initial Evaluation  [facility]    Name: Filiberto Godfrey  Clinic Number: [id number]    Therapy Diagnosis:   Encounter Diagnoses   Name Primary?    Repeated falls     Pender disease Yes    Palliative care by specialist     Impaired functional mobility, balance, gait, and endurance     Decreased strength      Physician: No ref. provider found    Physician Orders: PT Eval and Treat- HD Clinic  Medical Diagnosis from Referral: G10 (ICD-10-CM) - Pender disease  Evaluation Date: 9/10/2019    Time In: 1043  Time Out: 1125  Total Billable Time: 42 minutes    Precautions: Standard and Fall    Subjective     History of current condition - Filiberto reports: diagnosed with Pender's Disease ~6 years ago. Per wife, pt has declined (especially muscle mass) over the last 3-6 months. Earlier this year, pt was diagnosed with bladder cancer (per wife last test for cancer was clear, follow up scheduled 10/2019). PHMx: diabetic with insulin (1x/day), HTN controlled with meds, CABG x 1.     Medical History:   Past Medical History:   Diagnosis Date    Diabetes mellitus     Heart disease     Memory loss     Mental disorder     Movement disorder        Surgical History:   Filiberto Godfrey  has a past surgical history that includes Angioplasty (1992).    Medications:   Filiberto has a current medication list which includes the following prescription(s): amlodipine, aspirin, atorvastatin, cholecalciferol (vitamin d3), clonazepam, deutetrabenazine, docusate sodium, duloxetine, ferrous sulfate, finasteride, gabapentin, isosorbide mononitrate, lisinopril, meclizine, methylphenidate hcl, nitroglycerin, omeprazole, ondansetron, oxybutynin, quetiapine, ranitidine, senna-docusate 8.6-50 mg, tamsulosin, tizanidine, and trazodone.    Allergies:   Review of patient's allergies indicates:  No Known Allergies     Imaging, CT scan films: ordered 9/10/2019 for  head due to repeated falls    Prior Therapy: home health PT and OT x 1 month, recently d/c'd. Also reports home health PT for ~2 months last year  Social History:  lives with their spouse in a 1 story home, threshold to enter  Occupation: retired  DME: shower chair  Prior Level of Function: less frequent falls/ loss of balance, pt had better activity tolerance, able to get in and out of car without significant difficulty. Pt and wife were going for walks for physical fitness ~ 6 months ago.   Current Level of Function: falls at least 1x/week (multiple loss of balance daily), fell 6 months ago and possibly fractured R clavicle. Wife also reports pt fell several days ago and hit back of head, did not seek medical intervention. difficulty with getting in the car, difficulty opening car door. Pt is no longer going on walks for physical fitness.     Pain:  Current 0/10, worst 0/10, best 0/10   Location: NA    Description: NA  Aggravating Factors: NA  Easing Factors: NA    Pts goals: determining functional status    Objective   - Follows commands:  yes  - Speech: no deficits, limited vocalization during session. Some verbal tics noted    Mental status: alert, oriented to person, place, and time.   Appearance: Casually dressed  Behavior:  cooperative  Attention Span and Concentration:  Decreased    Dominant hand:  right     Posture Alignment :forward head, slouched posture. Holds arms and legs crossed    Sensation:  Light Touch: Intact           Proprioception:   Unable to formally assess due to difficulty with following commands    Tone: 0 - No increase in muscle tone  Limbs/muscles affected: NA    Is Chorea present? No significant chorea noted  Is Dystonia present? tounge protruding and verbal tics noted    Visual/Auditory: did not formally assess, pt does not open eyes fully during evaluation      Coordination:   - UE coordination/ thumb to fingertip:  Slow speed, aim worse on R   -Pronation/ supination: slow speed,  difficulty performing through full ROM  -Fist-hand-palm: 1x in 10 sec with 100% verbal and visual cues    - LE coordination/ heel to shin:  Unable to lift opposite heel higher than ankle  - Toe taps: unable to perform alternating taps, but can perform single toe tapping with maximal cues    ROM:   UPPER EXTREMITY--AROM/PROM  Refer to OT report for details          RANGE OF MOTION--LOWER EXTREMITIES  (R) LE Hip: equal bilaterally   Knee: equal bilaterally   Ankle: WFLs    (L) LE: Hip: equal bilaterally   Knee: equal bilaterally   Ankle: decreased to ~ neutral    Strength: manual muscle test grades below   Upper Extremity Strength  Refer to OT report for details    Lower Extremity Strength   RLE LLE   Hip Flexion: 3+/5 3+/5   Hip Extension:  2/5 2+/5   Hip Abduction: 4-/5 4-/5   Hip Adduction: 2-/5 2-/5   Knee Extension: 4-/5 4+/5   Knee Flexion: 2-/5 2+/5   Ankle Dorsiflexion: 3+/5 4-/5   Ankle Plantarflexion: 2/5 2+/5   Ankle Inversion: NT NT   Ankle Eversion: NT NT        Evaluation   Single Limb Stance R LE unable  (<10 sec = HIGH FALL RISK)   Single Limb Stance L LE 1 sec  (<10 sec = HIGH FALL RISK)   30 second Chair Rise 4 completed with arms   5 times sit-stand NT   TUG 13.3 seconds   Gamble 26/56     Postural control:  1. Eyes Open/feet together/Firm: 30 seconds  2. Eyes Closed/feet together/Firm: 15 seconds  3. Eyes Open/tandem/Firm: unable  4. Eyes Closed/tandem/Firm: unable    GAMBLE Balance Assessment  1. Sitting to Standing   3 - able to stand independely using hands  2. Standing Unsupported   2 - able to stand 30 seconds unsupported  3. Sitting Unsupported   2 - able to sit 30 seconds  4. Standing to Sitting   3 - controls descent by using hands  5. Pivot Transfer   3 - able to transfer safely with definite use of hands  6. Standing with Eyes Closed   4 - albe to stand 10 seconds safely  7. Standing with Feet Together   1 - needs help to attain position but able to stand 15 seconds feet together  8. Reaching  Forward with Outstretched Arm   2 - can reach forward 5 cm/2 inches safely  9. Retrieving Object from Floor   3 - able to pick slipper but needs supervision  10. Turning to Look Behind   1 - needs supervision when turning  11. Turning 360 Degrees   0 - needs assistance while turning  12. Placing Alternate Foot on Step   0 - needs assist to keep from falling/unable to try  13. Standing with One Foot in Front   1 - need help to step but can hold 15 seconds  14. Standing on One Foot   1 - tries to lift leg and unable to hold 3 seconds but remains standing independently  Total: 26  Maximum: 56= moderate fall risk    Score:   0-20= high fall risk   21-40 moderate fall risk   41-56 low fall risk     Fall risk cut-off scores:   Elderly and History of falls: <51/56   Elderly and No history of falls: <42/56   CVA: <45/56     Gait Assessment:   - AD used: none  - Assistance: supervision- mod I  - Distance: household  - Curb: NT  - Ramp:  NT      GAIT DEVIATIONS:  Filiberto displays the following deviations with ambulation: wide Base of support, decreased arm swing, increased lateral weight shifts. Pt has difficulty with stepping backwards, turning around, and stepping sideways.     Impairments contributing to deviations: impaired motor control, decreased strength, decreased activity tolerance.     Endurance Deficit: poor to fair for evaluation- able to participate in all activities, but pt had multiple sitting rest breaks. Pt is unable to sit without back support for >1 min    Functional Mobility (Bed mobility, transfers)  Bed mobility: Min A  Supine to sit: Min A  Sit to supine: Min A  Rolling: Min A  Transfers to bed: Min A  Transfers to toilet: NT  Sit to stand:  Mod I  Stand pivot:  Mod I  Car transfers: NT  Wheelchair mobility: D  Floor transfers: NT    TREATMENT   No treatment provided today, evaluation only.    Home Exercises and Patient Education Provided    Education provided:   - role of PT, recommendation for PT,  outcome of assessments reveiwed    Written Home Exercises Provided: Patient instructed to cont prior HEP from home health therapy.  Exercises were reviewed and Filiberto was able to demonstrate them prior to the end of the session.  Filiberto demonstrated fair  understanding of the education provided.     See EMR under NA for exercises provided from home health PT.    Assessment   This is a 68 y.o. male referred to [facility]. Pt  demonstrates limitations as described in the problem list. Due to pt's deficits, pt is at moderate risk for falls. Pt and wife report that pt experiences loss of balance multiple times throughout the day and falls ~1x/week. Pt's wife reports pt had a fall with injury ~6 months ago when walking outside. Per wife, pt possibly fractured R clavicle. A few days ago, pt's wife reported pt fell and hit head. Pt was not examined in ER after the incident of hitting head. Pt trialed a rolator in the past with PT, but was unable to squeeze brakes to stop device from rolling. Pt has not had the opportunity to trial a RW for gait. A RW would provide pt with increased stability if pt can manage device. Pt also demonstrates significant weakness in BLE. PT is warranted to address impairments stated below to improve mobility to decrease fall risk to reduce the chance of injury and to prevent further functional decline.    Pt rehab potential is Fair to good. Pt will benefit from skilled outpatient physical therapy to address the deficits listed below in the problem list, provide pt/family education and to maximize pt's level of independence in the home and community environment.     History  Co-morbidities and personal factors that may impact the plan of care Examination  Body Structures and Functions, activity limitations and participation restrictions that may impact the plan of care    Clinical Presentation   Co-morbidities:   history of cancer   CABG x 1  Diabetes         Personal  Factors:   coping style Body Regions:   lower extremities  upper extremities    Body Systems:    gross symmetry  ROM  strength  gross coordinated movement  balance  gait  transfers  transitions  motor control    Participation Restrictions:   1. Fall Risk - impaired balance   2. Weakness   3. Decreased ROM  4. Gait deviations   5. Decreased ambulation   6. Decreased activity tolerance   7. Difficulty participating in daily activities   8. Requires skilled supervision to complete and progress HEP      Activity limitations:   Learning and applying knowledge  watching  solving problems    General Tasks and Commands  undertaking multiple tasks    Communication  no deficits    Mobility  lifting and carrying objects  fine hand use (grasping/picking up)  walking  moving around using equipment (WC)  using transportation (bus, train, plane, car)    Self care  washing oneself (bathing, drying, washing hands)  caring for body parts (brushing teeth, shaving, grooming)  toileting  dressing  eating  looking after one's health    Domestic Life  shopping  cooking  doing house work (cleaning house, washing dishes, laundry)  assisting others    Interactions/Relationships  no deficits    Life Areas  no deficits    Community and Social Life  community life  recreation and leisure         unstable clinical presentation with unpredictable characteristics                      high     Recommendations: outpatient PT (preferred) or home health PT to address strength and balance, trial RW for mobility    Pt's spiritual, cultural and educational needs considered and pt agreeable to plan of care and goals as stated below:     GOALS:   To be determined by follow up PT    Plan   Outpatient physical therapy 2 times weekly for 8 weeks to include: Pt Education, HEP, therapeutic exercises, gait training, neuromuscular re-education, therapeutic activities, and modalities PRN to achieve established goals. Pt may be seen by PTA as part of the  rehabilitation team.     Pt will follow up at [facility] in 6 months- 1 year.      Lamar Portillo, PT  09/10/2019      I certify the need for these services furnished under this plan of treatment and while under my care.  ____________________________________ Physician/Referring Practitioner   Date of Signature

## 2019-09-13 ENCOUNTER — TELEPHONE (OUTPATIENT)
Dept: NEUROLOGY | Facility: CLINIC | Age: 69
End: 2019-09-13

## 2019-09-13 PROBLEM — R41.841 COGNITIVE COMMUNICATION DEFICIT: Status: ACTIVE | Noted: 2019-09-13

## 2019-09-13 PROBLEM — R47.1 DYSARTHRIA: Status: ACTIVE | Noted: 2019-09-13

## 2019-09-13 NOTE — TELEPHONE ENCOUNTER
12:25PM  Return call received from Codi with [facility].  She indicated speech generating device evals are done at the [facility] [phone] F [phone].  CRISTHIAN spoke with Lamar w/ [facility].  Lamar indicated there is a waitlist for the eval however it does move quickly.  Fax referral, pt/family will receive a questionnaire to complete and return, once questionnaire received, pt placed on waitlist-wait for an appointment.    Will discuss with pt/wife.       11:49AM  Case f/u regarding md order for AAC device.  CRISTHIAN reached out to [facility] op st department [phone] to inquire if staff SLP could assist w/ jeyson, vm message left requesting return call.  Awaiting return call.     Spoke with Lali w/ [facility] to see if their SLP can provide AAC device eval through hh?  Lali to contact CRISTHIAN with outcome.        ELISE BALDWIN

## 2019-09-17 NOTE — PROGRESS NOTES
Patient seen and examined in [facility].  Team discussed needs going forward.  Full note to follow.          Xavi Porter MD, MPH  Division of Movement and Memory Disorders  [facility]

## 2019-09-19 ENCOUNTER — TELEPHONE (OUTPATIENT)
Dept: NEUROLOGY | Facility: CLINIC | Age: 69
End: 2019-09-19

## 2019-09-19 NOTE — TELEPHONE ENCOUNTER
Case f/u regarding AAC Device  CRISTHIAN placed call to Lali w/ [facility] ([facility]) [phone] (F) [phone] to see if their SLP can provide AAC device eval through hh?  Lali to contact CRISTHIAN with outcome.        Call placed to pt's wife Elise to discuss rec for AAC device.  SW left vm message requesting return call.     Return call received from Lali w/ [facility]  who indicated their 2 SLPs are open to assisting for training on an AAC Device.  CRISTHIAN clarified request was for AAC Device EVAL.  Lali to notify CRISTHIAN of outcome.     Lali also informed SW pt had a change in level of care.  Pt was dc'd from Palliative care and admitted to Hospice on/around 9/12/19.  CRISTHIAN verbalized understanding.   CRISTHIAN and Lali did discuss that if pt remains w/ hospice he will not be able to move forward w/ obtaining  AAC Device.  SW indicated will address options with pt's wife but will need to confirm if [facility] are able to provice aac device eval.      Awaiting return call from Lali.      ELISE BALDWIN

## 2019-09-24 ENCOUNTER — TELEPHONE (OUTPATIENT)
Dept: NEUROLOGY | Facility: CLINIC | Age: 69
End: 2019-09-24

## 2019-09-24 NOTE — TELEPHONE ENCOUNTER
"Late Entry 9/20/19   SW and HD Clinic CC Dom spoke with pt's wife via phone regarding AAC Device eval as well as option to elect Hospice.  Pt's wife didn't feel the AAC Device was in the list of priorities at this time.  Pt's wife indicated pt appeared to be declining, she needed more help in the home, felt she didn't have any other alternatives.  Pt's wife indicated pt is followed by [facility].  Order provided by PCP.  Pt's wife explained that pt was attempting to get up from bed and some how slid between the wall and bed.  Pt's wife indicated 911 was called to assist as pt nor was she able to get him up.   Discussed Palliative Care which Pt's wife didn't seem to think was much help.   Pt's wife is the primary caregiver and assumes/provides all caregiver duties.  Pt's wife has very limited support, if any, from their 2 sons.   SW revisited the community [employer] waiver. Pt/spouse would have to be eligible for Medicaid and even if they are, there is a waitlist for the Waiver services unless you have a "priority dx (like ALS)".   These services will not be immediate.  Discussed NHP.  Pt's wife indicated one of her sons provided her with [facility] ([facility]).  SW/CC encouraged pt's wife to consider NHP as an option d/t, she is having health ailments w/out promptly addressing d/t being primary caregiver, very limited services in the community to assist, Community choice waiver -waitlist and no help from their sons.  The only other option is NHP.  SW/CC encouraged pt's wife to see nhp as an opportunity to get some help, enjoy the time you have left w/ pt as his wife, still oversee his care, and use this opportunity for self care.  Pt's wife indicated she would give it some thought and discuss with her sons.   SW/CC verbalized understanding.       ELISE BALDWIN  "

## 2021-01-29 PROBLEM — G10 HUNTINGTON'S DISEASE: Status: ACTIVE | Noted: 2021-01-29

## 2021-03-08 ENCOUNTER — TELEPHONE (OUTPATIENT)
Dept: NEUROLOGY | Facility: CLINIC | Age: 71
End: 2021-03-08

## 2021-03-15 ENCOUNTER — TELEPHONE (OUTPATIENT)
Dept: NEUROLOGY | Facility: CLINIC | Age: 71
End: 2021-03-15

## 2021-03-16 ENCOUNTER — OFFICE VISIT (OUTPATIENT)
Dept: NEUROLOGY | Facility: CLINIC | Age: 71
End: 2021-03-16
Payer: MEDICARE

## 2021-03-16 VITALS — WEIGHT: 155 LBS | BODY MASS INDEX: 22.19 KG/M2 | HEIGHT: 70 IN

## 2021-03-16 DIAGNOSIS — Z71.83 ENCOUNTER FOR NONPROCREATIVE GENETIC COUNSELING: ICD-10-CM

## 2021-03-16 DIAGNOSIS — Z74.09 IMPAIRED MOBILITY AND ADLS: Primary | ICD-10-CM

## 2021-03-16 DIAGNOSIS — Z51.5 PALLIATIVE CARE BY SPECIALIST: ICD-10-CM

## 2021-03-16 DIAGNOSIS — F02.80 MAJOR NEUROCOGNITIVE DISORDER DUE TO HUNTINGTON'S DISEASE: ICD-10-CM

## 2021-03-16 DIAGNOSIS — Z78.9 IMPAIRED MOBILITY AND ADLS: Primary | ICD-10-CM

## 2021-03-16 DIAGNOSIS — G10 MAJOR NEUROCOGNITIVE DISORDER DUE TO HUNTINGTON'S DISEASE: ICD-10-CM

## 2021-03-16 DIAGNOSIS — Z00.8 NUTRITIONAL ASSESSMENT: ICD-10-CM

## 2021-03-16 DIAGNOSIS — G10 HUNTINGTON DISEASE: ICD-10-CM

## 2021-03-16 PROCEDURE — 3288F PR FALLS RISK ASSESSMENT DOCUMENTED: ICD-10-PCS | Mod: CPTII,S$GLB,, | Performed by: PSYCHIATRY & NEUROLOGY

## 2021-03-16 PROCEDURE — 99215 PR OFFICE/OUTPT VISIT, EST, LEVL V, 40-54 MIN: ICD-10-PCS | Mod: S$GLB,,, | Performed by: PSYCHIATRY & NEUROLOGY

## 2021-03-16 PROCEDURE — 99215 OFFICE O/P EST HI 40 MIN: CPT | Mod: S$GLB,,, | Performed by: PSYCHIATRY & NEUROLOGY

## 2021-03-16 PROCEDURE — 92612 ENDOSCOPY SWALLOW (FEES) VID: CPT | Mod: PO | Performed by: SPEECH-LANGUAGE PATHOLOGIST

## 2021-03-16 PROCEDURE — 1159F PR MEDICATION LIST DOCUMENTED IN MEDICAL RECORD: ICD-10-PCS | Mod: S$GLB,,, | Performed by: PSYCHIATRY & NEUROLOGY

## 2021-03-16 PROCEDURE — 99497 PR ADVNCD CARE PLAN 30 MIN: ICD-10-PCS | Mod: S$GLB,,, | Performed by: INTERNAL MEDICINE

## 2021-03-16 PROCEDURE — 3288F FALL RISK ASSESSMENT DOCD: CPT | Mod: CPTII,S$GLB,, | Performed by: PSYCHIATRY & NEUROLOGY

## 2021-03-16 PROCEDURE — 1126F PR PAIN SEVERITY QUANTIFIED, NO PAIN PRESENT: ICD-10-PCS | Mod: S$GLB,,, | Performed by: PSYCHIATRY & NEUROLOGY

## 2021-03-16 PROCEDURE — 99497 ADVNCD CARE PLAN 30 MIN: CPT | Mod: S$GLB,,, | Performed by: INTERNAL MEDICINE

## 2021-03-16 PROCEDURE — 1159F MED LIST DOCD IN RCRD: CPT | Mod: S$GLB,,, | Performed by: PSYCHIATRY & NEUROLOGY

## 2021-03-16 PROCEDURE — 99999 PR PBB SHADOW E&M-EST. PATIENT-LVL III: CPT | Mod: PBBFAC,,, | Performed by: PSYCHIATRY & NEUROLOGY

## 2021-03-16 PROCEDURE — 3008F PR BODY MASS INDEX (BMI) DOCUMENTED: ICD-10-PCS | Mod: CPTII,S$GLB,, | Performed by: PSYCHIATRY & NEUROLOGY

## 2021-03-16 PROCEDURE — 1100F PTFALLS ASSESS-DOCD GE2>/YR: CPT | Mod: CPTII,S$GLB,, | Performed by: PSYCHIATRY & NEUROLOGY

## 2021-03-16 PROCEDURE — 99498 ADVNCD CARE PLAN ADDL 30 MIN: CPT | Mod: S$GLB,,, | Performed by: INTERNAL MEDICINE

## 2021-03-16 PROCEDURE — 1100F PR PT FALLS ASSESS DOC 2+ FALLS/FALL W/INJURY/YR: ICD-10-PCS | Mod: CPTII,S$GLB,, | Performed by: PSYCHIATRY & NEUROLOGY

## 2021-03-16 PROCEDURE — 99999 PR PBB SHADOW E&M-EST. PATIENT-LVL III: ICD-10-PCS | Mod: PBBFAC,,, | Performed by: PSYCHIATRY & NEUROLOGY

## 2021-03-16 PROCEDURE — 96116 PR NEUROBEHAVIORAL STATUS EXAM BY PSYCH/PHYS: ICD-10-PCS | Mod: S$GLB,,, | Performed by: CLINICAL NEUROPSYCHOLOGIST

## 2021-03-16 PROCEDURE — 96116 NUBHVL XM PHYS/QHP 1ST HR: CPT | Mod: S$GLB,,, | Performed by: CLINICAL NEUROPSYCHOLOGIST

## 2021-03-16 PROCEDURE — 92610 EVALUATE SWALLOWING FUNCTION: CPT | Mod: PO | Performed by: SPEECH-LANGUAGE PATHOLOGIST

## 2021-03-16 PROCEDURE — 99498 PR ADVNCD CARE PLAN ADDL 30 MIN: ICD-10-PCS | Mod: S$GLB,,, | Performed by: INTERNAL MEDICINE

## 2021-03-16 PROCEDURE — 3008F BODY MASS INDEX DOCD: CPT | Mod: CPTII,S$GLB,, | Performed by: PSYCHIATRY & NEUROLOGY

## 2021-03-16 PROCEDURE — 1126F AMNT PAIN NOTED NONE PRSNT: CPT | Mod: S$GLB,,, | Performed by: PSYCHIATRY & NEUROLOGY

## 2021-03-17 ENCOUNTER — TELEPHONE (OUTPATIENT)
Dept: NEUROLOGY | Facility: CLINIC | Age: 71
End: 2021-03-17

## 2021-03-17 PROBLEM — Z51.5 PALLIATIVE CARE BY SPECIALIST: Status: ACTIVE | Noted: 2021-03-16

## 2021-03-22 NOTE — PROGRESS NOTES
"Name: Filiberto Godfrey  MRN: [id number]   CSN: [id number]      Date: 07/08/2019    Chief Complaint / Interval History:  - more issues with needs at home  - has bladder cancer, now with indwelling catheter  - still have vocal tics (growls), now still taking seroquel 12.5 qhs  - interested in palliative care  - needs more social work assistance   - more drooling  - more catheter issues and drainage    From 11/27/18:  - still moving head backwards, hits it and says it "feels good"  - grunts and huffs with tic-ing  - seeing Dr. Rehana Li in [facility] in [place] now - will reach out to her - last appt in Sept.  Took him off the Zyprexa and now on 18-18 Austedo  - more issues with urinary leakage, and Urologist found a "speck" - going to be cleared by Cardiology   - more issues with walking, talking, argumentative, obstinate  - movements generally a little worse  - some weight loss  - more tone  - some extensor posturing  - more thirst, drinking up to 32   - also started celexa,   -    From July:  - generally less aware  - more swallow issues  - fall risk  - willing to wiggle meds  - not traveling 'for nothing"      Last seen 6/6/18:  - worsened speech and mouth movements that get in the way of communicating  - more grunting, more repeating phrases, some vocal tics, more tongue protrusiona nd bumping his head back, keeps his legs crossed and actually hits up against the underside of the table  - choking was a bad but now MUCH better (her words), rare cough after drinking  - a little mood instability  - constipated of and on   - some urinary urgency and cannot control it  - using finasteride 5 mg 2 months ago   - no LH or dizziness when standing  - loss of equilibrium doubled over 6 months     From FEb 2017:  - Accompanied by his wife  - Saw Dr. Baker who instructed Mr. Godfrey to stop his tetrabenazine  - He had a lot of grunting tics  - After being off TBZ for three days, his chorea got really bad  - Was " "started on gabapentin by Dr. Baker after which his chorea continued to worsen and he began having rapid shallow breathing    - He had trouble getting a follow up appointment with Dr. Baker so he saw a family doctor, at that time he was breathing at a rate of 30s to 40s  - Gabapentin was increased and he was recommended to start on olanzapine but did not start this   - His walking became bad with toe walking on his right side  - Having some gait festination and freezing   - He is choking on water when he swallows  - Has had personality changes where he is turning into more of a "walflower" and has been more quiet  - He is no longer able to shave safely  - having dribbling of urine   - Urine is dark and odorous    From last visit 8/25/16:   - started tbz at interdisciplinary visit, only at night.  Movements the same, gait is worsening.  Not s ure why they never went higher on dosing.  - moving next month to [place] - will plan to see Olivia  - more repeating self and memory  - feels sick and tired of this type of living" - mentions the frustration with the grunts and trunk movements      History of Present Illness (HPI):  66 yo with HD    Movements - initially developed leg movements, "couldn't sit still" affecting arms and legs eventually.  Misdiagnosed as RLS at that time.  But progressed to involve whole body movements.  Saw "some neurologist" in [place].  Then saw Dr. Sanders, he referred to Kavita at [facility].      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  2008 - invested $2.1 million, bad timing, but also some mismanagement from him that contributed to major losses.  Declared bankruptcy.     Mood - more irritable.  Tried Haldol for sleep about 2 years ago.  Now using Seroquel for sleep.  Restless at night, and gets up a lot at night.  Says he sleeps or lays down during the day to minimize the grunting.  On " Wellbutrin/Klonopin/Tizanidine, unclear when or why prescribed.  Apparently he has trouble managing his medications, might be taking more klonopin during the day than prescribed.  Demonstrated recklessness in the past with gambling.  No extramarital affairs.  Has depression and feels like he is worthless, cannot stand the growling/grunting.  Lots of worry about the future and now.    Generally good until Altaf of this year.  Movements increased.  Now more vocal tics and grunting.  Contributing to irritability.    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  Chaotic then, said they lost his blood test twice...    Family history: Mother  at age 69 (had tongue thrust, impaired gait, and mood/memory changes), brother has symptoms (CAG 41), two maternal cousins (unk CAG repeats).  Has two unaffected sibs (to date, 1 bro, 1 sis).     30+ years, 2 sons (age 30, 32), 3 grandkids.       Living in [facility].      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 No  Falls (Musculoskeletal)?Yes - at risk  Cognitive impairment (Neurologic)?Yes  Psychoses (Psychiatric)?No - no hallucinations or delusions  Pain/Paresthesia (Neurologic)?No  Visual changes (Eyes)?No  Moles / skin changes (Skin)?No  Stridor / SOB (Pulm)?No  Bruising (Heme)?No    Past Medical History: The patient  has a past medical history of Diabetes mellitus, Heart disease, Memory loss, Mental disorder, and Movement disorder.    Social History: The patient  reports that he has never smoked. He does not have any smokeless tobacco history on file. He reports that he does not drink alcohol or use drugs.    Family History: Their family history is not  on file.    Allergies: Patient has no known allergies.     Meds:   Current Outpatient Medications on File Prior to Visit   Medication Sig Dispense Refill    amlodipine (NORVASC) 10 MG tablet       aspirin (ECOTRIN) 81 MG EC tablet Take 81 mg by mouth once daily.      atorvastatin (LIPITOR) 20 MG tablet Take 20 mg by mouth once daily.      cholecalciferol, vitamin D3, (VITAMIN D3) 1,000 unit capsule Take 1,000 Units by mouth once daily.      clonazePAM (KLONOPIN) 1 MG tablet 1 mg.       deutetrabenazine (AUSTEDO) 9 mg Tab Take 9 mg by mouth 2 (two) times daily. Take in conjunction with 6mg for total of 15mg twice a day. 60 tablet 11    docusate sodium (COLACE) 50 MG capsule Take by mouth 2 (two) times daily.      ferrous sulfate 325 mg (65 mg iron) Tab tablet Take 325 mg by mouth daily with breakfast.      finasteride (PROSCAR) 5 mg tablet 1 Tablet Once a day THANK YOU  3    isosorbide mononitrate (IMDUR) 30 MG 24 hr tablet Take 20 mg by mouth once daily.      lisinopril 10 MG tablet Take 10 mg by mouth.      omeprazole (PRILOSEC) 20 MG capsule Take 20 mg by mouth once daily.      ondansetron (ZOFRAN-ODT) 4 MG TbDL       oxybutynin (DITROPAN-XL) 10 MG 24 hr tablet Take 10 mg by mouth.      QUEtiapine (SEROQUEL) 25 MG Tab 12.5 mg.       ranitidine (ZANTAC) 150 MG tablet Take 150 mg by mouth.      tamsulosin (FLOMAX) 0.4 mg Cp24 Take 0.4 mg by mouth.      duloxetine (CYMBALTA) 20 MG capsule Take 20 mg by mouth.      gabapentin (NEURONTIN) 300 MG capsule Take 300 mg by mouth.      meclizine (ANTIVERT) 25 mg tablet       nitroGLYCERIN (NITROSTAT) 0.4 MG SL tablet Place 0.4 mg under the tongue every 5 (five) minutes as needed for Chest pain.      senna-docusate 8.6-50 mg (PERICOLACE) 8.6-50 mg per tablet Take 1 tablet by mouth.      tizanidine 4 mg Cap Take 4 mg by mouth once daily.      trazodone (DESYREL) 50 MG tablet Take 50 mg by mouth every evening.      [DISCONTINUED] buPROPion (WELLBUTRIN  "SR) 150 MG TBSR 12 hr tablet       [DISCONTINUED] citalopram (CELEXA) 20 MG tablet       [DISCONTINUED] deutetrabenazine (AUSTEDO) 12 mg Tab Take by mouth 2 (two) times daily.      [DISCONTINUED] deutetrabenazine (AUSTEDO) 6 mg Tab Take 6 mg by mouth 2 (two) times daily. Take in conjunction to 9mg for a total of 15mg twice daily. 60 tablet 11    [DISCONTINUED] SITagliptan-metformin (JANUMET XR) 100-1,000 mg TM24 Take 1 tablet by mouth 2 (two) times daily with meals.       No current facility-administered medications on file prior to visit.        Exam:  /85   Pulse 97   Ht 5' 10" (1.778 m)   Wt 87.9 kg (193 lb 12.6 oz)   BMI 27.81 kg/m²     * Specialized movement exam  No hypophonic speech.    No facial masking.   No cogwheel rigidity.     + mild B bradykinesia.   No tremor with rest, posture, kinesis, or intention.    See UHDRS below.  Includes chorea and grunting tics.   No myoclonus.   No motor impersistence.   Wide-based gait.   No shortened stride length.  + extensor abnormal arm swing.     ++ postural instability.      UHDRS Chorea Score    Face 0   NITIN 1   Trunk 1   RUE 0   LUE 0   RLE 0   LLE 0       TOTAL 2       0 Absent  1 Slight/intermittent  2 Mild/common or moderate/intermittent  3 Moderate/common  4  Marked/prolonged    Laboratory/Radiological:  - Results:  No visits with results within 3 Month(s) from this visit.   Latest known visit with results is:   Lab Visit on 02/23/2017   Component Date Value Ref Range Status    Specimen UA 02/23/2017 Urine, Unspecified   Final    Color, UA 02/23/2017 Yellow  Yellow, Straw, Khushi Final    Appearance, UA 02/23/2017 Clear  Clear Final    pH, UA 02/23/2017 5.0  5.0 - 8.0 Final    Specific Gravity, UA 02/23/2017 1.015  1.005 - 1.030 Final    Protein, UA 02/23/2017 Negative  Negative Final    Glucose, UA 02/23/2017 Negative  Negative Final    Ketones, UA 02/23/2017 Negative  Negative Final    Bilirubin (UA) 02/23/2017 Negative  Negative Final "    Occult Blood UA 2017 Negative  Negative Final    Nitrite, UA 2017 Negative  Negative Final    Urobilinogen, UA 2017 Negative  <2.0 EU/dL Final    Leukocytes, UA 2017 Negative  Negative Final     - Independent review of images: none here    Diagnoses:          1) HD, moderate in severity at this time.  Palliative care is appropriate.  2) Depression, contracts today for safety    Medical Decision Makin) inc the seroquel to 12.5 BID  2) glycopyrrolate for drooling, botox will be ordered  3) austedo 18 BID  4) will trial ritalin for wakefulness during the day  5) palliative care consult  6) HD clinic on September - October        Xavi Porter MD, MPH  Division of Movement and Memory Disorders  [facility]          Mirvaso Counseling: Mirvaso is a topical medication which can decrease superficial blood flow where applied. Side effects are uncommon and include stinging, redness and allergic reactions.

## 2021-03-23 DIAGNOSIS — G10 HUNTINGTON DISEASE: ICD-10-CM

## 2021-03-23 PROBLEM — F02.80 MAJOR NEUROCOGNITIVE DISORDER DUE TO HUNTINGTON'S DISEASE: Status: ACTIVE | Noted: 2021-03-23

## 2021-03-23 PROBLEM — F02.80 MAJOR NEUROCOGNITIVE DISORDER DUE TO HUNTINGTON'S DISEASE: Chronic | Status: ACTIVE | Noted: 2021-03-23

## 2021-03-23 RX ORDER — DEUTETRABENAZINE 9 MG/1
12 TABLET, COATED ORAL 2 TIMES DAILY
Qty: 60 TABLET | Refills: 11 | Status: SHIPPED | OUTPATIENT
Start: 2021-03-23 | End: 2022-01-17 | Stop reason: SDUPTHER

## 2021-03-26 ENCOUNTER — TELEPHONE (OUTPATIENT)
Dept: NEUROLOGY | Facility: CLINIC | Age: 71
End: 2021-03-26

## 2021-04-19 ENCOUNTER — TELEPHONE (OUTPATIENT)
Dept: NEUROLOGY | Facility: CLINIC | Age: 71
End: 2021-04-19

## 2021-04-20 ENCOUNTER — TELEPHONE (OUTPATIENT)
Dept: NEUROLOGY | Facility: CLINIC | Age: 71
End: 2021-04-20

## 2021-04-23 ENCOUNTER — TELEPHONE (OUTPATIENT)
Dept: NEUROLOGY | Facility: CLINIC | Age: 71
End: 2021-04-23

## 2021-05-16 ENCOUNTER — SPECIALTY PHARMACY (OUTPATIENT)
Dept: PHARMACY | Facility: CLINIC | Age: 71
End: 2021-05-16

## 2021-07-30 ENCOUNTER — TELEPHONE (OUTPATIENT)
Dept: NEUROLOGY | Facility: CLINIC | Age: 71
End: 2021-07-30

## 2021-08-04 DIAGNOSIS — G10 HUNTINGTON'S DISEASE: Primary | ICD-10-CM

## 2021-08-04 RX ORDER — ATROPINE SULFATE 10 MG/ML
2 SOLUTION/ DROPS OPHTHALMIC 2 TIMES DAILY PRN
Qty: 5 ML | Refills: 11 | Status: SHIPPED | OUTPATIENT
Start: 2021-08-04

## 2021-08-04 RX ORDER — CLONAZEPAM 0.5 MG/1
0.5 TABLET ORAL 2 TIMES DAILY PRN
Qty: 60 TABLET | Refills: 5 | Status: SHIPPED | OUTPATIENT
Start: 2021-08-04

## 2021-08-04 RX ORDER — CLONAZEPAM 0.5 MG/1
0.5 TABLET ORAL 2 TIMES DAILY PRN
COMMUNITY
Start: 2021-06-28 | End: 2021-08-04 | Stop reason: SDUPTHER

## 2021-08-19 ENCOUNTER — TELEPHONE (OUTPATIENT)
Dept: NEUROLOGY | Facility: CLINIC | Age: 71
End: 2021-08-19

## 2021-08-24 ENCOUNTER — PATIENT MESSAGE (OUTPATIENT)
Dept: NEUROLOGY | Facility: CLINIC | Age: 71
End: 2021-08-24

## 2021-08-24 ENCOUNTER — TELEPHONE (OUTPATIENT)
Dept: NEUROLOGY | Facility: CLINIC | Age: 71
End: 2021-08-24

## 2021-08-24 ENCOUNTER — CLINICAL SUPPORT (OUTPATIENT)
Dept: NEUROLOGY | Facility: CLINIC | Age: 71
End: 2021-08-24
Payer: MEDICARE

## 2021-08-24 DIAGNOSIS — G10 HUNTINGTON'S DISEASE: Primary | ICD-10-CM

## 2021-08-24 PROCEDURE — 99215 PR OFFICE/OUTPT VISIT, EST, LEVL V, 40-54 MIN: ICD-10-PCS | Mod: 95,,, | Performed by: PSYCHIATRY & NEUROLOGY

## 2021-08-24 PROCEDURE — 99215 OFFICE O/P EST HI 40 MIN: CPT | Mod: 95,,, | Performed by: PSYCHIATRY & NEUROLOGY

## 2021-10-25 ENCOUNTER — TELEPHONE (OUTPATIENT)
Dept: NEUROLOGY | Facility: CLINIC | Age: 71
End: 2021-10-25
Payer: MEDICARE

## 2021-10-26 ENCOUNTER — CLINICAL SUPPORT (OUTPATIENT)
Dept: CARDIOLOGY | Facility: HOSPITAL | Age: 71
End: 2021-10-26
Attending: PSYCHIATRY & NEUROLOGY
Payer: MEDICARE

## 2021-10-26 DIAGNOSIS — G10 HUNTINGTON'S DISEASE: ICD-10-CM

## 2021-10-26 DIAGNOSIS — Z78.9 IMPAIRED MOBILITY AND ADLS: ICD-10-CM

## 2021-10-26 DIAGNOSIS — Z74.09 IMPAIRED MOBILITY AND ADLS: ICD-10-CM

## 2021-10-26 PROCEDURE — 93005 ELECTROCARDIOGRAM TRACING: CPT | Mod: PO

## 2021-10-26 PROCEDURE — 93010 EKG 12-LEAD: ICD-10-PCS | Mod: ,,, | Performed by: INTERNAL MEDICINE

## 2021-10-26 PROCEDURE — 93010 ELECTROCARDIOGRAM REPORT: CPT | Mod: ,,, | Performed by: INTERNAL MEDICINE

## 2021-11-16 ENCOUNTER — PATIENT MESSAGE (OUTPATIENT)
Dept: NEUROLOGY | Facility: CLINIC | Age: 71
End: 2021-11-16
Payer: MEDICARE

## 2022-01-17 ENCOUNTER — NURSE TRIAGE (OUTPATIENT)
Dept: ADMINISTRATIVE | Facility: CLINIC | Age: 72
End: 2022-01-17
Payer: MEDICARE

## 2022-01-17 DIAGNOSIS — G10 HUNTINGTON DISEASE: ICD-10-CM

## 2022-01-17 RX ORDER — DEUTETRABENAZINE 9 MG/1
12 TABLET, COATED ORAL 2 TIMES DAILY
Qty: 60 TABLET | Refills: 0 | Status: SHIPPED | OUTPATIENT
Start: 2022-01-17 | End: 2022-01-18 | Stop reason: DRUGHIGH

## 2022-01-17 NOTE — TELEPHONE ENCOUNTER
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 he has been out for 6-7 days and is having worsening tremors. she s/w [employer] already and they gave her the phone #  to call in order to give prior auth [phone]. They would like to use [facility] on [address] in [place], [place]. Pt's spouse would also need to know if he can resume at the same dosage or if he should taper up?  I sent a SC msg to LAURA Fitch to see if she can assist with this or if she prefer I page the neurologist on call.  Awaiting response.  LAURA Fitch stated she was willing to assist with PA, but uncomfortable with    Determining dosage or need for tapering, requests that I reach out to neurologist on call.  SC sent to Dr. Trujillo, awaiting response.  Dr. Trujillo' response:   hello! thanks for the info, unfortunately I cannot help with this because I have not seen this patient. please contact doctor Xavi Porter (he has been following and he prescribed it)  Reached out to Dr. Porter via SC.  His response: Thanks for reaching out. Our nurse team in clinic works with me for prior auths. I dont see any messages in the chart until this from you right now (do you?). Im traveling and will return tomorrow. If your PA desires to work the prior auth in the holiday, they are welcome to! He would restart back at his 12 mg BID dosing.    I sent a SC message to LAURA Fitch with the above information from Dr. Porter.  Her response: Ok I will give it my best shot    Will wait to hear back from LAURA Fitch, with an update, and then reach out to the patient's spouse to inform her.      Reason for Disposition   [1] Prescription refill request for ESSENTIAL medicine (i.e., likelihood of harm to patient if not taken) AND [2] triager unable to refill per department policy     [employer] requesting Prior Auth    Additional Information   Negative: [1] Intentional drug overdose  AND [2] suicidal thoughts or ideas   Negative: Drug overdose and triager unable to answer question   Negative: Caller requesting information unrelated to medicine   Negative: Caller requesting information about COVID-19 Vaccine   Negative: Caller requesting information about Emergency Contraception   Negative: Caller requesting information about Combined Birth Control Pills   Negative: Caller requesting information about Progestin Birth Control Pills   Negative: Caller requesting information about Post-Op pain or medicines   Negative: Caller requesting a prescription antibiotic (such as Penicillin) for Strep throat and has a positive culture result   Negative: Caller requesting a prescription anti-viral med (such as Tamiflu) and has influenza (flu)  symptoms   Negative: Immunization reaction suspected   Negative: Rash while taking a medicine or within 3 days of stopping it   Negative: [1] Asthma and [2] having symptoms of asthma (cough, wheezing, etc.)   Negative: [1] Symptom of illness (e.g., headache, abdominal pain, earache, vomiting) AND [2] more than mild   Negative: Breastfeeding questions about mother's medicines and diet   Negative: MORE THAN A DOUBLE DOSE of a prescription or over-the-counter (OTC) drug   Negative: [1] DOUBLE DOSE (an extra dose or lesser amount) of prescription drug AND [2] any symptoms (e.g., dizziness, nausea, pain, sleepiness)   Negative: [1] DOUBLE DOSE (an extra dose or lesser amount) of over-the-counter (OTC) drug AND [2] any symptoms (e.g., dizziness, nausea, pain, sleepiness)   Negative: Took another person's prescription drug   Negative: Diabetes drug error or overdose (e.g., took wrong type of insulin or took extra dose)   Negative: [1] DOUBLE DOSE (an extra dose or lesser amount) of prescription drug AND [2] NO symptoms (Exception: a double dose of antibiotics)    Protocols used: MEDICATION QUESTION CALL-A-

## 2022-01-17 NOTE — TELEPHONE ENCOUNTER
LAURA Fitch was unable to successfully obtain Prior Auth.  I advised the pt's spouse of this and that I would forward it to the clinic to address upon return to the office.  She verbalized understanding.

## 2022-01-18 ENCOUNTER — SPECIALTY PHARMACY (OUTPATIENT)
Dept: PHARMACY | Facility: CLINIC | Age: 72
End: 2022-01-18
Payer: MEDICARE

## 2022-01-18 NOTE — TELEPHONE ENCOUNTER
PA submitted for [employer] for Austedo via phone     Reference # [id number]    Patient Lamar (DIL) and Bolivar (Son) have medical power of  of patient. WC call completed. Lamar states the patient movements are controlled on Austedo. Without medication (has been out for 6 days), the patient movements are uncontrolled.

## 2022-01-19 NOTE — TELEPHONE ENCOUNTER
Specialty Pharmacy - Medication/Referral Authorization  Specialty Pharmacy - Refill Coordination    Specialty Medication Orders Linked to Encounter    Flowsheet Row Most Recent Value   Medication #1 deutetrabenazine (AUSTEDO) 9 mg Tab (Order#[id number], Rx#[id number])        Patient is existing to therapy, patient is out of medication for about 8 days due to insurance reauth    Refill Questions - Documented Responses    Flowsheet Row Most Recent Value   Patient Availability and HIPAA Verification    Does patient want to proceed with activity? Yes   HIPAA/medical authority confirmed? Yes   Relationship to patient of person spoken to? Spouse/Significant Other   Refill Screening Questions    Changes to allergies? No   Changes to medications? No   New conditions since last clinic visit? No   Unplanned office visit, urgent care, ED, or hospital admission in the last 4 weeks? No   How does patient/caregiver feel medication is working? Excellent   Financial problems or insurance changes? No   How many doses of your specialty medications were missed in the last 4 weeks? > 5   Would patient like to speak to a pharmacist? No   When does the patient need to receive the medication? 01/20/22   Refill Delivery Questions    How will the patient receive the medication? Delivery Vikki   When does the patient need to receive the medication? 01/20/22   Shipping Address Home   Address in [facility] confirmed and updated if neccessary? Yes   Expected Copay ($) 0   Is the patient able to afford the medication copay? Yes   Payment Method zero copay   Days supply of Refill 30   Supplies needed? No supplies needed   Refill activity completed? Yes   Refill activity plan Refill scheduled   Shipment/Pickup Date: 01/20/22          Current Outpatient Medications   Medication Sig    amlodipine (NORVASC) 10 MG tablet     aspirin (ECOTRIN) 81 MG EC tablet Take 81 mg by mouth once daily.    atorvastatin (LIPITOR) 20 MG tablet Take 20 mg by  mouth once daily.    atropine 1% (ISOPTO ATROPINE) 1 % Drop Place 2 drops under the tongue 2 (two) times daily as needed (for drooling).    cholecalciferol, vitamin D3, (VITAMIN D3) 1,000 unit capsule Take 1,000 Units by mouth once daily.    clonazePAM (KLONOPIN) 0.5 MG tablet Take 1 tablet (0.5 mg total) by mouth 2 (two) times daily as needed for Anxiety.    deutetrabenazine (AUSTEDO) 9 mg Tab Take 3 tablets by mouth every morning AND 2 tablets every evening.    deutetrabenazine (AUSTEDO) 9 mg Tab Take 3 tablets (27 mg) by mouth every morning AND 2 tablets (18 mg) every evening.    docusate sodium (COLACE) 50 MG capsule Take by mouth 2 (two) times daily.    duloxetine (CYMBALTA) 20 MG capsule Take 20 mg by mouth.    ferrous sulfate 325 mg (65 mg iron) Tab tablet Take 325 mg by mouth daily with breakfast.    finasteride (PROSCAR) 5 mg tablet 1 Tablet Once a day THANK YOU    gabapentin (NEURONTIN) 300 MG capsule Take 300 mg by mouth.    HYDROcodone-acetaminophen (NORCO) 7.5-325 mg per tablet     isosorbide mononitrate (IMDUR) 30 MG 24 hr tablet Take 20 mg by mouth once daily.    lisinopril 10 MG tablet Take 10 mg by mouth.    meclizine (ANTIVERT) 25 mg tablet     methylphenidate HCl (RITALIN) 5 MG tablet Take 1 tablet (5 mg total) by mouth once daily.    nitroGLYCERIN (NITROSTAT) 0.4 MG SL tablet Place 0.4 mg under the tongue every 5 (five) minutes as needed for Chest pain.    omeprazole (PRILOSEC) 20 MG capsule Take 20 mg by mouth once daily.    ondansetron (ZOFRAN-ODT) 4 MG TbDL     oxybutynin (DITROPAN-XL) 10 MG 24 hr tablet Take 10 mg by mouth.    penicillin v potassium (VEETID) 500 MG tablet     permethrin (ELIMITE) 5 % cream     QUEtiapine (SEROQUEL) 25 MG Tab 12.5 mg.     ranitidine (ZANTAC) 150 MG tablet Take 150 mg by mouth.    senna-docusate 8.6-50 mg (PERICOLACE) 8.6-50 mg per tablet Take 1 tablet by mouth.    sertraline (ZOLOFT) 100 MG tablet     tamsulosin (FLOMAX) 0.4 mg Cp24  Take 0.4 mg by mouth.    tizanidine 4 mg Cap Take 4 mg by mouth once daily.    trazodone (DESYREL) 50 MG tablet Take 50 mg by mouth every evening.   Last reviewed on 3/16/2021  8:44 AM by Jesusita Darnell MA    Review of patient's allergies indicates:  No Known Allergies Last reviewed on  1/17/2022 12:55 PM by Angeles Renteria      Tasks added this encounter   1/18/2022 - Welcome Call  1/18/2022 - Referral Authorization   Tasks due within next 3 months   No tasks due.     Gerardo Herrmann, PharmD  [facility]  [address] [place], [place] [address]  [facility] [address]  Phone: [phone]  Fax: [phone]

## 2022-01-19 NOTE — TELEPHONE ENCOUNTER
PA for Austedo APPROVED, Call to patient's wife for refill.     Gerardo Herrmann, PharmD  Clinical Pharmacist  [facility]  P: [phone]

## 2022-01-26 ENCOUNTER — TELEPHONE (OUTPATIENT)
Dept: NEUROLOGY | Facility: CLINIC | Age: 72
End: 2022-01-26
Payer: MEDICARE

## 2022-01-26 NOTE — TELEPHONE ENCOUNTER
Spoke with Pharmacist Bing at [facility]. She is wanting clarification on Austedo prescription received. Advised that this is an error and this was sent accidentally. Bing discontinued the prescription and verified that the patient has fulfillment elsewhere. Will continue to monitor.

## 2022-01-26 NOTE — TELEPHONE ENCOUNTER
----- Message from Shola Kaur sent at 1/26/2022  8:20 AM CST -----  Contact: katie   Mrn# [id number]    Callback# [phone]    Additional Info# A worker from the [facility] pharmacy has a medication that's instructions do my match the dosage of the prescription. The worker states that this was an emergency refill request and they have been trying to touch base with the DR with no avail. Please callback

## 2022-02-11 ENCOUNTER — SPECIALTY PHARMACY (OUTPATIENT)
Dept: PHARMACY | Facility: CLINIC | Age: 72
End: 2022-02-11
Payer: MEDICARE

## 2022-02-11 NOTE — TELEPHONE ENCOUNTER
Specialty Pharmacy - Refill Coordination    Specialty Medication Orders Linked to Encounter    Flowsheet Row Most Recent Value   Medication #1 deutetrabenazine (AUSTEDO) 9 mg Tab (Order#[id number], Rx#[id number])          Refill Questions - Documented Responses    Flowsheet Row Most Recent Value   Patient Availability and HIPAA Verification    Does patient want to proceed with activity? Yes   HIPAA/medical authority confirmed? Yes   Relationship to patient of person spoken to? Care Giver  [Jacinda]   Refill Screening Questions    Changes to allergies? No   Changes to medications? No   New conditions since last clinic visit? No   Unplanned office visit, urgent care, ED, or hospital admission in the last 4 weeks? No   How does patient/caregiver feel medication is working? Excellent   Financial problems or insurance changes? No   How many doses of your specialty medications were missed in the last 4 weeks? 0   Would patient like to speak to a pharmacist? No   When does the patient need to receive the medication? 02/15/22   Refill Delivery Questions    How will the patient receive the medication? Delivery Vikki   When does the patient need to receive the medication? 02/15/22   Shipping Address Home   Address in [facility] confirmed and updated if neccessary? Yes   Expected Copay ($) 0   Is the patient able to afford the medication copay? Yes   Payment Method zero copay   Days supply of Refill 30   Supplies needed? No supplies needed   Refill activity completed? Yes   Refill activity plan Refill scheduled   Shipment/Pickup Date: 02/14/22          Current Outpatient Medications   Medication Sig    amlodipine (NORVASC) 10 MG tablet     aspirin (ECOTRIN) 81 MG EC tablet Take 81 mg by mouth once daily.    atorvastatin (LIPITOR) 20 MG tablet Take 20 mg by mouth once daily.    atropine 1% (ISOPTO ATROPINE) 1 % Drop Place 2 drops under the tongue 2 (two) times daily as needed (for drooling).    cholecalciferol, vitamin  D3, (VITAMIN D3) 1,000 unit capsule Take 1,000 Units by mouth once daily.    clonazePAM (KLONOPIN) 0.5 MG tablet Take 1 tablet (0.5 mg total) by mouth 2 (two) times daily as needed for Anxiety.    deutetrabenazine (AUSTEDO) 9 mg Tab Take 3 tablets by mouth every morning AND 2 tablets every evening.    deutetrabenazine (AUSTEDO) 9 mg Tab Take 3 tablets (27 mg) by mouth every morning AND 2 tablets (18 mg) every evening.    docusate sodium (COLACE) 50 MG capsule Take by mouth 2 (two) times daily.    duloxetine (CYMBALTA) 20 MG capsule Take 20 mg by mouth.    ferrous sulfate 325 mg (65 mg iron) Tab tablet Take 325 mg by mouth daily with breakfast.    finasteride (PROSCAR) 5 mg tablet 1 Tablet Once a day THANK YOU    gabapentin (NEURONTIN) 300 MG capsule Take 300 mg by mouth.    HYDROcodone-acetaminophen (NORCO) 7.5-325 mg per tablet     isosorbide mononitrate (IMDUR) 30 MG 24 hr tablet Take 20 mg by mouth once daily.    lisinopril 10 MG tablet Take 10 mg by mouth.    meclizine (ANTIVERT) 25 mg tablet     methylphenidate HCl (RITALIN) 5 MG tablet Take 1 tablet (5 mg total) by mouth once daily.    nitroGLYCERIN (NITROSTAT) 0.4 MG SL tablet Place 0.4 mg under the tongue every 5 (five) minutes as needed for Chest pain.    omeprazole (PRILOSEC) 20 MG capsule Take 20 mg by mouth once daily.    ondansetron (ZOFRAN-ODT) 4 MG TbDL     oxybutynin (DITROPAN-XL) 10 MG 24 hr tablet Take 10 mg by mouth.    penicillin v potassium (VEETID) 500 MG tablet     permethrin (ELIMITE) 5 % cream     QUEtiapine (SEROQUEL) 25 MG Tab 12.5 mg.     ranitidine (ZANTAC) 150 MG tablet Take 150 mg by mouth.    senna-docusate 8.6-50 mg (PERICOLACE) 8.6-50 mg per tablet Take 1 tablet by mouth.    sertraline (ZOLOFT) 100 MG tablet     tamsulosin (FLOMAX) 0.4 mg Cp24 Take 0.4 mg by mouth.    tizanidine 4 mg Cap Take 4 mg by mouth once daily.    trazodone (DESYREL) 50 MG tablet Take 50 mg by mouth every evening.   Last reviewed on  3/16/2021  8:44 AM by Jesusita Darnell MA    Review of patient's allergies indicates:  No Known Allergies Last reviewed on  1/17/2022 12:55 PM by Angeles Renteria      Tasks added this encounter   3/10/2022 - Refill Call (Auto Added)   Tasks due within next 3 months   4/12/2022 - Clinical - Follow Up Assesement (90 day)     Thea Chawla, PharmD  [facility]  [address] [place], [place] [address]  [facility] [address]  Phone: [phone]  Fax: [phone]

## 2022-03-10 ENCOUNTER — SPECIALTY PHARMACY (OUTPATIENT)
Dept: PHARMACY | Facility: CLINIC | Age: 72
End: 2022-03-10
Payer: MEDICARE

## 2022-03-10 NOTE — TELEPHONE ENCOUNTER
Specialty Pharmacy - Refill Coordination    Specialty Medication Orders Linked to Encounter    Flowsheet Row Most Recent Value   Medication #1 deutetrabenazine (AUSTEDO) 9 mg Tab (Order#[id number], Rx#)          Refill Questions - Documented Responses    Flowsheet Row Most Recent Value   Patient Availability and HIPAA Verification    Does patient want to proceed with activity? Yes   HIPAA/medical authority confirmed? Yes   Relationship to patient of person spoken to? Family Member   Refill Screening Questions    Changes to allergies? No   Changes to medications? No   New conditions since last clinic visit? No   Unplanned office visit, urgent care, ED, or hospital admission in the last 4 weeks? No   How does patient/caregiver feel medication is working? Good   Financial problems or insurance changes? No   How many doses of your specialty medications were missed in the last 4 weeks? 0   Would patient like to speak to a pharmacist? No   When does the patient need to receive the medication? 03/15/22   Refill Delivery Questions    How will the patient receive the medication? Delivery Vikki   When does the patient need to receive the medication? 03/15/22   Shipping Address Home   Address in [facility] confirmed and updated if neccessary? Yes   Expected Copay ($) 0   Is the patient able to afford the medication copay? Yes   Payment Method zero copay   Days supply of Refill 30   Supplies needed? No supplies needed   Refill activity completed? Yes   Refill activity plan Refill scheduled   Shipment/Pickup Date: 03/14/22          Current Outpatient Medications   Medication Sig    amlodipine (NORVASC) 10 MG tablet     aspirin (ECOTRIN) 81 MG EC tablet Take 81 mg by mouth once daily.    atorvastatin (LIPITOR) 20 MG tablet Take 20 mg by mouth once daily.    atropine 1% (ISOPTO ATROPINE) 1 % Drop Place 2 drops under the tongue 2 (two) times daily as needed (for drooling).    cholecalciferol, vitamin D3, (VITAMIN D3)  1,000 unit capsule Take 1,000 Units by mouth once daily.    clonazePAM (KLONOPIN) 0.5 MG tablet Take 1 tablet (0.5 mg total) by mouth 2 (two) times daily as needed for Anxiety.    deutetrabenazine (AUSTEDO) 9 mg Tab Take 3 tablets by mouth every morning AND 2 tablets every evening.    deutetrabenazine (AUSTEDO) 9 mg Tab Take 3 tablets (27 mg) by mouth every morning AND 2 tablets (18 mg) every evening.    docusate sodium (COLACE) 50 MG capsule Take by mouth 2 (two) times daily.    duloxetine (CYMBALTA) 20 MG capsule Take 20 mg by mouth.    ferrous sulfate 325 mg (65 mg iron) Tab tablet Take 325 mg by mouth daily with breakfast.    finasteride (PROSCAR) 5 mg tablet 1 Tablet Once a day THANK YOU    gabapentin (NEURONTIN) 300 MG capsule Take 300 mg by mouth.    HYDROcodone-acetaminophen (NORCO) 7.5-325 mg per tablet     isosorbide mononitrate (IMDUR) 30 MG 24 hr tablet Take 20 mg by mouth once daily.    lisinopril 10 MG tablet Take 10 mg by mouth.    meclizine (ANTIVERT) 25 mg tablet     methylphenidate HCl (RITALIN) 5 MG tablet Take 1 tablet (5 mg total) by mouth once daily.    nitroGLYCERIN (NITROSTAT) 0.4 MG SL tablet Place 0.4 mg under the tongue every 5 (five) minutes as needed for Chest pain.    omeprazole (PRILOSEC) 20 MG capsule Take 20 mg by mouth once daily.    ondansetron (ZOFRAN-ODT) 4 MG TbDL     oxybutynin (DITROPAN-XL) 10 MG 24 hr tablet Take 10 mg by mouth.    penicillin v potassium (VEETID) 500 MG tablet     permethrin (ELIMITE) 5 % cream     QUEtiapine (SEROQUEL) 25 MG Tab 12.5 mg.     ranitidine (ZANTAC) 150 MG tablet Take 150 mg by mouth.    senna-docusate 8.6-50 mg (PERICOLACE) 8.6-50 mg per tablet Take 1 tablet by mouth.    sertraline (ZOLOFT) 100 MG tablet     tamsulosin (FLOMAX) 0.4 mg Cp24 Take 0.4 mg by mouth.    tizanidine 4 mg Cap Take 4 mg by mouth once daily.    trazodone (DESYREL) 50 MG tablet Take 50 mg by mouth every evening.   Last reviewed on 3/16/2021  8:44  AM by Jesusita Darnell MA    Review of patient's allergies indicates:  No Known Allergies Last reviewed on  1/17/2022 12:55 PM by Angeles Renteria      Tasks added this encounter   4/7/2022 - Refill Call (Auto Added)   Tasks due within next 3 months   4/12/2022 - Clinical - Follow Up Assesement (90 day)     [facility]  [address]  [facility] [address]  Phone: [phone]  Fax: [phone]

## 2022-04-01 ENCOUNTER — TELEPHONE (OUTPATIENT)
Dept: NEUROLOGY | Facility: CLINIC | Age: 72
End: 2022-04-01

## 2022-04-01 NOTE — TELEPHONE ENCOUNTER
----- Message from Steffany Pennington sent at 4/1/2022  9:33 AM CDT -----  Regarding: questions  Contact: [phone]  Pt Questions    Who Called:Pt daughter Nicole  Questions:Calling to speak with the dr about the pt   Call Back number: [phone]

## 2022-04-07 ENCOUNTER — SPECIALTY PHARMACY (OUTPATIENT)
Dept: PHARMACY | Facility: CLINIC | Age: 72
End: 2022-04-07
Payer: MEDICARE

## 2022-04-07 NOTE — TELEPHONE ENCOUNTER
Specialty Pharmacy - Refill Coordination    Specialty Medication Orders Linked to Encounter    Flowsheet Row Most Recent Value   Medication #1 deutetrabenazine (AUSTEDO) 9 mg Tab (Order#[id number], Rx#[id number])          Refill Questions - Documented Responses    Flowsheet Row Most Recent Value   Patient Availability and HIPAA Verification    Does patient want to proceed with activity? Yes   HIPAA/medical authority confirmed? Yes   Relationship to patient of person spoken to? Child   Refill Screening Questions    Changes to allergies? No   Changes to medications? No   New conditions since last clinic visit? No   Unplanned office visit, urgent care, ED, or hospital admission in the last 4 weeks? No   How does patient/caregiver feel medication is working? Good   Financial problems or insurance changes? No   How many doses of your specialty medications were missed in the last 4 weeks? 0   Would patient like to speak to a pharmacist? No   When does the patient need to receive the medication? 04/15/22   Refill Delivery Questions    How will the patient receive the medication? Delivery Vikki   When does the patient need to receive the medication? 04/15/22   Shipping Address Home   Address in [facility] confirmed and updated if neccessary? Yes   Expected Copay ($) 0   Is the patient able to afford the medication copay? Yes   Payment Method zero copay   Days supply of Refill 30   Supplies needed? No supplies needed   Refill activity completed? Yes   Refill activity plan Refill scheduled   Shipment/Pickup Date: 04/12/22          Current Outpatient Medications   Medication Sig    amlodipine (NORVASC) 10 MG tablet     aspirin (ECOTRIN) 81 MG EC tablet Take 81 mg by mouth once daily.    atorvastatin (LIPITOR) 20 MG tablet Take 20 mg by mouth once daily.    atropine 1% (ISOPTO ATROPINE) 1 % Drop Place 2 drops under the tongue 2 (two) times daily as needed (for drooling).    cholecalciferol, vitamin D3, (VITAMIN D3)  1,000 unit capsule Take 1,000 Units by mouth once daily.    clonazePAM (KLONOPIN) 0.5 MG tablet Take 1 tablet (0.5 mg total) by mouth 2 (two) times daily as needed for Anxiety.    deutetrabenazine (AUSTEDO) 9 mg Tab Take 3 tablets by mouth every morning AND 2 tablets every evening.    deutetrabenazine (AUSTEDO) 9 mg Tab Take 3 tablets (27 mg) by mouth every morning AND 2 tablets (18 mg) every evening.    docusate sodium (COLACE) 50 MG capsule Take by mouth 2 (two) times daily.    duloxetine (CYMBALTA) 20 MG capsule Take 20 mg by mouth.    ferrous sulfate 325 mg (65 mg iron) Tab tablet Take 325 mg by mouth daily with breakfast.    finasteride (PROSCAR) 5 mg tablet 1 Tablet Once a day THANK YOU    gabapentin (NEURONTIN) 300 MG capsule Take 300 mg by mouth.    HYDROcodone-acetaminophen (NORCO) 7.5-325 mg per tablet     isosorbide mononitrate (IMDUR) 30 MG 24 hr tablet Take 20 mg by mouth once daily.    lisinopril 10 MG tablet Take 10 mg by mouth.    meclizine (ANTIVERT) 25 mg tablet     methylphenidate HCl (RITALIN) 5 MG tablet Take 1 tablet (5 mg total) by mouth once daily.    nitroGLYCERIN (NITROSTAT) 0.4 MG SL tablet Place 0.4 mg under the tongue every 5 (five) minutes as needed for Chest pain.    omeprazole (PRILOSEC) 20 MG capsule Take 20 mg by mouth once daily.    ondansetron (ZOFRAN-ODT) 4 MG TbDL     oxybutynin (DITROPAN-XL) 10 MG 24 hr tablet Take 10 mg by mouth.    penicillin v potassium (VEETID) 500 MG tablet     permethrin (ELIMITE) 5 % cream     QUEtiapine (SEROQUEL) 25 MG Tab 12.5 mg.     ranitidine (ZANTAC) 150 MG tablet Take 150 mg by mouth.    senna-docusate 8.6-50 mg (PERICOLACE) 8.6-50 mg per tablet Take 1 tablet by mouth.    sertraline (ZOLOFT) 100 MG tablet     tamsulosin (FLOMAX) 0.4 mg Cp24 Take 0.4 mg by mouth.    tizanidine 4 mg Cap Take 4 mg by mouth once daily.    trazodone (DESYREL) 50 MG tablet Take 50 mg by mouth every evening.   Last reviewed on 3/16/2021  8:44  AM by Jesusita Darnell MA    Review of patient's allergies indicates:  No Known Allergies Last reviewed on  1/17/2022 12:55 PM by Angeles Renteria      Tasks added this encounter   5/5/2022 - Refill Call (Auto Added)   Tasks due within next 3 months   4/12/2022 - Clinical - Follow Up Assesement (90 day)     [facility]  [address]  [facility] [address]  Phone: [phone]  Fax: [phone]

## 2022-04-13 ENCOUNTER — TELEPHONE (OUTPATIENT)
Dept: NEUROLOGY | Facility: CLINIC | Age: 72
End: 2022-04-13
Payer: MEDICARE

## 2022-04-13 NOTE — TELEPHONE ENCOUNTER
SW called and spoke with pt's daughter Nicole, who is assisting her brother, with whom the pt lives/ is pt primary caregiver. Nicole relays that they are interested in respite care, SW will look into this.  Nicole reports that pt needs more support at home, even though they have a care giver during the day- having difficult nights. SW consulting with MD for palliative care order.

## 2022-04-18 ENCOUNTER — PATIENT MESSAGE (OUTPATIENT)
Dept: ADMINISTRATIVE | Facility: OTHER | Age: 72
End: 2022-04-18
Payer: MEDICARE

## 2022-05-05 ENCOUNTER — SPECIALTY PHARMACY (OUTPATIENT)
Dept: PHARMACY | Facility: CLINIC | Age: 72
End: 2022-05-05
Payer: MEDICARE

## 2022-05-05 NOTE — TELEPHONE ENCOUNTER
Specialty Pharmacy - Refill Coordination    Specialty Medication Orders Linked to Encounter    Flowsheet Row Most Recent Value   Medication #1 deutetrabenazine (AUSTEDO) 9 mg Tab (Order#[id number], Rx#[id number])          Refill Questions - Documented Responses    Flowsheet Row Most Recent Value   Patient Availability and HIPAA Verification    Does patient want to proceed with activity? Yes   HIPAA/medical authority confirmed? Yes   Relationship to patient of person spoken to? Self   Refill Screening Questions    Changes to allergies? No   Changes to medications? No   New conditions since last clinic visit? No   Unplanned office visit, urgent care, ED, or hospital admission in the last 4 weeks? No   How does patient/caregiver feel medication is working? Good   Financial problems or insurance changes? No   How many doses of your specialty medications were missed in the last 4 weeks? 0   Would patient like to speak to a pharmacist? No   When does the patient need to receive the medication? 05/10/22   Refill Delivery Questions    How will the patient receive the medication? Delivery Vikki   When does the patient need to receive the medication? 05/10/22   Shipping Address Home   Address in [facility] confirmed and updated if neccessary? Yes   Expected Copay ($) 0   Is the patient able to afford the medication copay? Yes   Payment Method zero copay   Days supply of Refill 30   Supplies needed? No supplies needed   Refill activity completed? Yes   Refill activity plan Refill scheduled   Shipment/Pickup Date: 05/10/22          Current Outpatient Medications   Medication Sig    amlodipine (NORVASC) 10 MG tablet     aspirin (ECOTRIN) 81 MG EC tablet Take 81 mg by mouth once daily.    atorvastatin (LIPITOR) 20 MG tablet Take 20 mg by mouth once daily.    atropine 1% (ISOPTO ATROPINE) 1 % Drop Place 2 drops under the tongue 2 (two) times daily as needed (for drooling).    cholecalciferol, vitamin D3, (VITAMIN D3)  1,000 unit capsule Take 1,000 Units by mouth once daily.    clonazePAM (KLONOPIN) 0.5 MG tablet Take 1 tablet (0.5 mg total) by mouth 2 (two) times daily as needed for Anxiety.    deutetrabenazine (AUSTEDO) 9 mg Tab Take 3 tablets by mouth every morning AND 2 tablets every evening.    deutetrabenazine (AUSTEDO) 9 mg Tab Take 3 tablets (27 mg) by mouth every morning AND 2 tablets (18 mg) every evening.    docusate sodium (COLACE) 50 MG capsule Take by mouth 2 (two) times daily.    duloxetine (CYMBALTA) 20 MG capsule Take 20 mg by mouth.    ferrous sulfate 325 mg (65 mg iron) Tab tablet Take 325 mg by mouth daily with breakfast.    finasteride (PROSCAR) 5 mg tablet 1 Tablet Once a day THANK YOU    gabapentin (NEURONTIN) 300 MG capsule Take 300 mg by mouth.    HYDROcodone-acetaminophen (NORCO) 7.5-325 mg per tablet     isosorbide mononitrate (IMDUR) 30 MG 24 hr tablet Take 20 mg by mouth once daily.    lisinopril 10 MG tablet Take 10 mg by mouth.    meclizine (ANTIVERT) 25 mg tablet     methylphenidate HCl (RITALIN) 5 MG tablet Take 1 tablet (5 mg total) by mouth once daily.    nitroGLYCERIN (NITROSTAT) 0.4 MG SL tablet Place 0.4 mg under the tongue every 5 (five) minutes as needed for Chest pain.    omeprazole (PRILOSEC) 20 MG capsule Take 20 mg by mouth once daily.    ondansetron (ZOFRAN-ODT) 4 MG TbDL     oxybutynin (DITROPAN-XL) 10 MG 24 hr tablet Take 10 mg by mouth.    penicillin v potassium (VEETID) 500 MG tablet     permethrin (ELIMITE) 5 % cream     QUEtiapine (SEROQUEL) 25 MG Tab 12.5 mg.     ranitidine (ZANTAC) 150 MG tablet Take 150 mg by mouth.    senna-docusate 8.6-50 mg (PERICOLACE) 8.6-50 mg per tablet Take 1 tablet by mouth.    sertraline (ZOLOFT) 100 MG tablet     tamsulosin (FLOMAX) 0.4 mg Cp24 Take 0.4 mg by mouth.    tizanidine 4 mg Cap Take 4 mg by mouth once daily.    trazodone (DESYREL) 50 MG tablet Take 50 mg by mouth every evening.   Last reviewed on 3/16/2021  8:44  AM by Jesusita Darnell MA    Review of patient's allergies indicates:  Not on File Last reviewed on  1/17/2022 12:55 PM by Angeles Renteria      Tasks added this encounter   6/2/2022 - Refill Call (Auto Added)   Tasks due within next 3 months   No tasks due.     [facility]  [address] [place] [address]  Phone: [phone]  Fax: [phone]

## 2022-05-24 ENCOUNTER — TELEPHONE (OUTPATIENT)
Dept: NEUROLOGY | Facility: CLINIC | Age: 72
End: 2022-05-24
Payer: MEDICARE

## 2022-05-24 DIAGNOSIS — G10 HUNTINGTON DISEASE: Primary | ICD-10-CM

## 2022-05-24 NOTE — TELEPHONE ENCOUNTER
CRISTHIAN spoke to pt's daughter Nicole and daughter-in-law Lamar over the phone. Pt lives with Lamar, Nicole lives in [place]. Nicole and Lamar relay that pt is being cared for in his home (by Lamar and his son, caregiver part of the day) but pt has declined significantly in the past two months (can't/won't eat even soft/liquid foods, chokes often, has lost significant weight, now nonverbal, gait worsening).      When asked about long term care planning, both daughters seem hesitant to entertain the idea of an SNF, wanted to know what to expect in the next few month in terms of his level of care. aLmar relayed that she is not sure how much more they can handle. SW placed order for palliative care consultation.    Family asks that pt be seen by  in-person as soon as possible, preferably on June 7th or June 17th. SW will reach out to MA to schedule.     Family has not gotten pt respite but are still interested. In speaking about insurance coverage, Lamar mentioned that pt's insurance has changed. CRISTHIAN reaching out to MA to update.      Family reports that an agency came by their home to assess pt, but they aren't sure for what (hospice? Respite? Palliative care?) as no one but the caregiver was home. Family reportedly assumed CRISTHIAN had put in this order but CRISTHIAN had not.  Whatever the assessment was for, the pt was denied, and his family relayed that they think the caregiver played down his needs to keep her job.

## 2022-05-26 ENCOUNTER — OFFICE VISIT (OUTPATIENT)
Dept: NEUROLOGY | Facility: CLINIC | Age: 72
End: 2022-05-26
Payer: MEDICARE

## 2022-05-26 DIAGNOSIS — G10 HUNTINGTON DISEASE: Primary | ICD-10-CM

## 2022-05-26 PROCEDURE — 1157F PR ADVANCE CARE PLAN OR EQUIV PRESENT IN MEDICAL RECORD: ICD-10-PCS | Mod: CPTII,95,, | Performed by: PSYCHIATRY & NEUROLOGY

## 2022-05-26 PROCEDURE — 1157F ADVNC CARE PLAN IN RCRD: CPT | Mod: CPTII,95,, | Performed by: PSYCHIATRY & NEUROLOGY

## 2022-05-26 PROCEDURE — 99214 PR OFFICE/OUTPT VISIT, EST, LEVL IV, 30-39 MIN: ICD-10-PCS | Mod: 95,,, | Performed by: PSYCHIATRY & NEUROLOGY

## 2022-05-26 PROCEDURE — 99214 OFFICE O/P EST MOD 30 MIN: CPT | Mod: 95,,, | Performed by: PSYCHIATRY & NEUROLOGY

## 2022-05-26 RX ORDER — SERTRALINE HYDROCHLORIDE 100 MG/1
150 TABLET, FILM COATED ORAL DAILY
Qty: 135 TABLET | Refills: 3 | Status: SHIPPED | OUTPATIENT
Start: 2022-05-26

## 2022-05-26 RX ORDER — MEGESTROL ACETATE 40 MG/1
40 TABLET ORAL DAILY
Qty: 30 TABLET | Refills: 11 | Status: SHIPPED | OUTPATIENT
Start: 2022-05-26 | End: 2023-05-26

## 2022-05-26 NOTE — PROGRESS NOTES
"Neurology Telemedicine Note     Name: Filiberto Godfrey  MRN: [id number]   CSN: [id number]      Date: 05/26/2022    The patient location is: Home  The chief complaint leading to consultation is: f/u  Visit type: Virtual visit with synchronous audio and video    TOTAL TIME SPENT: 24 min    Each patient to whom I provide medical services by telemedicine is:  (1) informed of the relationship between the physician and patient and the respective role of any other health care provider with respect to management of the patient; and (2) notified that they may decline to receive medical services by telemedicine and may withdraw from such care at any time.    Interval history:  - trying to eat frequently, all pureed, but he is still having trouble eating them safely  - "choking them down"  - appetite is poor  - full time care at home, with family  - no trouble with med plan  - clear worsening overall, would benefit from more help at home      From SW: Pt attended virtual visit with son Bolivar. Pt now lives with son and daughter in law Lamar, and their three kids age 2-9 y/o. Son reported feeling well connected w [employer], have 'community choice waiver' which provides 50-60 hours of caregiver in home. When asked how the family is coping, son reports that they have been scheduling date nights, and have  every other Friday night, but privacy is gone. SW praised the couple's efforts to maintain intimacy and wellbeing as they care for pt in their home. Son reports that oldest child 9 y/o, and youngest child, 3 y/o, are adjusting very well but middle child is more sensitive to what she perceives is pt's suffering. SW encouraged son to consider family therapy so that everyone feels heard and seen through this transition. Son reports that they are connected to their [facility]. Lamar goes to counseling every 2-3 wks. Bolivar tested neg for HD.      From 2021:  - was in [facility] in [place] - "was not good"  - now " home for three weeks and 2 days, staying with Bolivar and his wife Lamar  - and now doing better, wasn't eating and had little help there  - had continued to lose weight, got to as low as 140  - still taking the Austedo, now up to 45 mg divided daily (as 3 am and 2 pm of 9mg dosing  - home health is bringing PT and OT twice per week  - no speech therapy at current  - using a walker, still some falls, but not injurying himself - may just try to sit too early and flop down  - now dentures  - more frustration, some cursing and hand gestures        History of Present Illness (HPI) from 3/2021:  - here with wife of 41 years, her adult daughter, his adult son Bolivar (tested neg) and DIL, other son Deangelo on facetime  - Deangelo (untested) has HCPOA  - living in NH for one year, wants to go home  - using WC or walker, excessive chorea is worese  - reportedly losing weight, 15 lbs over last several months, not eating well, might choke  - family is in collective agreement about his leaving the NH and moving down her on [facility] with Bolivar's family  - discussed needs from pall care, insurance needs, home dme, new HCPOA and onging plans for living will  - Dr. Idalmis Sinha of [facility] assisted    Nonmotor/Premotor ROS: as per HPI, and all other systems are negative    Past Medical History: The patient  has a past medical history of Diabetes mellitus, Heart disease, Memory loss, Mental disorder, and Movement disorder.    Social History: The patient  reports that he has never smoked. He does not have any smokeless tobacco history on file. He reports that he does not drink alcohol and does not use drugs.    Family History: Their family history is not on file.    Allergies: Patient has no allergy information on record.             Meds:   Current Outpatient Medications on File Prior to Visit   Medication Sig Dispense Refill    amlodipine (NORVASC) 10 MG tablet       aspirin (ECOTRIN) 81 MG EC tablet Take 81 mg by mouth once  daily.      atorvastatin (LIPITOR) 20 MG tablet Take 20 mg by mouth once daily.      atropine 1% (ISOPTO ATROPINE) 1 % Drop Place 2 drops under the tongue 2 (two) times daily as needed (for drooling). 5 mL 11    cholecalciferol, vitamin D3, (VITAMIN D3) 1,000 unit capsule Take 1,000 Units by mouth once daily.      clonazePAM (KLONOPIN) 0.5 MG tablet Take 1 tablet (0.5 mg total) by mouth 2 (two) times daily as needed for Anxiety. 60 tablet 05    deutetrabenazine (AUSTEDO) 9 mg Tab Take 3 tablets by mouth every morning AND 2 tablets every evening. 150 tablet 11    deutetrabenazine (AUSTEDO) 9 mg Tab Take 3 tablets (27 mg) by mouth every morning AND 2 tablets (18 mg) every evening. 150 tablet 11    docusate sodium (COLACE) 50 MG capsule Take by mouth 2 (two) times daily.      duloxetine (CYMBALTA) 20 MG capsule Take 20 mg by mouth.      ferrous sulfate 325 mg (65 mg iron) Tab tablet Take 325 mg by mouth daily with breakfast.      finasteride (PROSCAR) 5 mg tablet 1 Tablet Once a day THANK YOU  3    gabapentin (NEURONTIN) 300 MG capsule Take 300 mg by mouth.      HYDROcodone-acetaminophen (NORCO) 7.5-325 mg per tablet       isosorbide mononitrate (IMDUR) 30 MG 24 hr tablet Take 20 mg by mouth once daily.      lisinopril 10 MG tablet Take 10 mg by mouth.      meclizine (ANTIVERT) 25 mg tablet       methylphenidate HCl (RITALIN) 5 MG tablet Take 1 tablet (5 mg total) by mouth once daily. 30 tablet 0    nitroGLYCERIN (NITROSTAT) 0.4 MG SL tablet Place 0.4 mg under the tongue every 5 (five) minutes as needed for Chest pain.      omeprazole (PRILOSEC) 20 MG capsule Take 20 mg by mouth once daily.      ondansetron (ZOFRAN-ODT) 4 MG TbDL       oxybutynin (DITROPAN-XL) 10 MG 24 hr tablet Take 10 mg by mouth.      penicillin v potassium (VEETID) 500 MG tablet       permethrin (ELIMITE) 5 % cream       QUEtiapine (SEROQUEL) 25 MG Tab 12.5 mg.       ranitidine (ZANTAC) 150 MG tablet Take 150 mg by mouth.       senna-docusate 8.6-50 mg (PERICOLACE) 8.6-50 mg per tablet Take 1 tablet by mouth.      sertraline (ZOLOFT) 100 MG tablet       tamsulosin (FLOMAX) 0.4 mg Cp24 Take 0.4 mg by mouth.      tizanidine 4 mg Cap Take 4 mg by mouth once daily.      trazodone (DESYREL) 50 MG tablet Take 50 mg by mouth every evening.       No current facility-administered medications on file prior to visit.     Medical Record Review:  - Lab Results:  No visits with results within 3 Month(s) from this visit.   Latest known visit with results is:   Admission on 01/29/2021, Discharged on 01/29/2021   Component Date Value Ref Range Status    POCT Glucose 01/29/2021 178 (A) 70 - 110 mg/dL Final     Exam:  Vital Signs deferred with home visit      UHDRS Chorea Score    Face 2   NITIN 3   Trunk 2   RUE 2   LUE 2   RLE 2   LLE 2       TOTAL 15       0 Absent  1 Slight/intermittent  2 Mild/common or moderate/intermittent  3 Moderate/common  4  Marked/prolonged      Medical Record Review:  - Lab Results:  No visits with results within 3 Month(s) from this visit.   Latest known visit with results is:   Admission on 01/29/2021, Discharged on 01/29/2021   Component Date Value Ref Range Status    POCT Glucose 01/29/2021 178* 70 - 110 mg/dL Final           Diagnoses:          HD, progressing and approaching end-of-life needs.  Certainly qualifies for NH placement now.  Will focus on palliative care and hospice.    Medical Decision Making:  - increasing zoloft to 150  - adding megace now  - help from our  for placement          Xavi Porter MD, MPH  Division of Movement and Memory Disorders  [facility]  [phone]

## 2022-06-06 ENCOUNTER — SPECIALTY PHARMACY (OUTPATIENT)
Dept: PHARMACY | Facility: CLINIC | Age: 72
End: 2022-06-06
Payer: MEDICARE

## 2022-06-06 NOTE — TELEPHONE ENCOUNTER
Specialty Pharmacy - Refill Coordination    Specialty Medication Orders Linked to Encounter    Flowsheet Row Most Recent Value   Medication #1 deutetrabenazine (AUSTEDO) 9 mg Tab (Order#[id number], Rx#[id number])          Refill Questions - Documented Responses    Flowsheet Row Most Recent Value   Patient Availability and HIPAA Verification    Does patient want to proceed with activity? Yes   HIPAA/medical authority confirmed? Yes   Relationship to patient of person spoken to? Child   Refill Screening Questions    Changes to allergies? No   Changes to medications? No   New conditions since last clinic visit? No   Unplanned office visit, urgent care, ED, or hospital admission in the last 4 weeks? No   How does patient/caregiver feel medication is working? Good   Financial problems or insurance changes? No   How many doses of your specialty medications were missed in the last 4 weeks? 0   Would patient like to speak to a pharmacist? No   When does the patient need to receive the medication? 06/11/22   Refill Delivery Questions    How will the patient receive the medication? Delivery Vikki   When does the patient need to receive the medication? 06/11/22   Shipping Address Home   Address in [place] confirmed and updated if neccessary? Yes   Expected Copay ($) 0   Is the patient able to afford the medication copay? Yes   Payment Method zero copay   Days supply of Refill 30   Supplies needed? No supplies needed   Refill activity completed? Yes   Refill activity plan Refill scheduled   Shipment/Pickup Date: 06/07/22          Current Outpatient Medications   Medication Sig    amlodipine (NORVASC) 10 MG tablet     aspirin (ECOTRIN) 81 MG EC tablet Take 81 mg by mouth once daily.    atorvastatin (LIPITOR) 20 MG tablet Take 20 mg by mouth once daily.    atropine 1% (ISOPTO ATROPINE) 1 % Drop Place 2 drops under the tongue 2 (two) times daily as needed (for drooling).    cholecalciferol, vitamin D3, (VITAMIN D3)  1,000 unit capsule Take 1,000 Units by mouth once daily.    clonazePAM (KLONOPIN) 0.5 MG tablet Take 1 tablet (0.5 mg total) by mouth 2 (two) times daily as needed for Anxiety.    deutetrabenazine (AUSTEDO) 9 mg Tab Take 3 tablets by mouth every morning AND 2 tablets every evening.    deutetrabenazine (AUSTEDO) 9 mg Tab Take 3 tablets (27 mg) by mouth every morning AND 2 tablets (18 mg) every evening.    docusate sodium (COLACE) 50 MG capsule Take by mouth 2 (two) times daily.    ferrous sulfate 325 mg (65 mg iron) Tab tablet Take 325 mg by mouth daily with breakfast.    finasteride (PROSCAR) 5 mg tablet 1 Tablet Once a day THANK YOU    gabapentin (NEURONTIN) 300 MG capsule Take 300 mg by mouth.    HYDROcodone-acetaminophen (NORCO) 7.5-325 mg per tablet     isosorbide mononitrate (IMDUR) 30 MG 24 hr tablet Take 20 mg by mouth once daily.    lisinopril 10 MG tablet Take 10 mg by mouth.    meclizine (ANTIVERT) 25 mg tablet     megestroL (MEGACE) 40 MG Tab Take 1 tablet (40 mg total) by mouth once daily.    methylphenidate HCl (RITALIN) 5 MG tablet Take 1 tablet (5 mg total) by mouth once daily.    nitroGLYCERIN (NITROSTAT) 0.4 MG SL tablet Place 0.4 mg under the tongue every 5 (five) minutes as needed for Chest pain.    omeprazole (PRILOSEC) 20 MG capsule Take 20 mg by mouth once daily.    ondansetron (ZOFRAN-ODT) 4 MG TbDL     oxybutynin (DITROPAN-XL) 10 MG 24 hr tablet Take 10 mg by mouth.    penicillin v potassium (VEETID) 500 MG tablet     permethrin (ELIMITE) 5 % cream     QUEtiapine (SEROQUEL) 25 MG Tab 12.5 mg.     ranitidine (ZANTAC) 150 MG tablet Take 150 mg by mouth.    senna-docusate 8.6-50 mg (PERICOLACE) 8.6-50 mg per tablet Take 1 tablet by mouth.    sertraline (ZOLOFT) 100 MG tablet Take 1.5 tablets (150 mg total) by mouth once daily.    tamsulosin (FLOMAX) 0.4 mg Cp24 Take 0.4 mg by mouth.    tizanidine 4 mg Cap Take 4 mg by mouth once daily.    trazodone (DESYREL) 50 MG  tablet Take 50 mg by mouth every evening.   Last reviewed on 3/16/2021  8:44 AM by Jesusita Darnell MA    Review of patient's allergies indicates:  Not on File Last reviewed on  5/26/2022 4:12 PM by Xavi Porter      Tasks added this encounter   7/4/2022 - Refill Call (Auto Added)   Tasks due within next 3 months   No tasks due.     Zay Bond, PharmD  [facility]  [address] [place] [id number]  Phone: [phone]  Fax: [phone]

## 2022-07-01 ENCOUNTER — TELEPHONE (OUTPATIENT)
Dept: NEUROLOGY | Facility: CLINIC | Age: 72
End: 2022-07-01
Payer: MEDICARE

## 2022-07-01 NOTE — TELEPHONE ENCOUNTER
SW received call from pt's wife Elise asking for medical records, mentioned that pt is involved in class action lawsuit. CRISTHIAN left  with Medical Records number [phone] and asked to call back to discuss case.

## 2022-07-05 ENCOUNTER — SPECIALTY PHARMACY (OUTPATIENT)
Dept: PHARMACY | Facility: CLINIC | Age: 72
End: 2022-07-05
Payer: MEDICARE

## 2022-07-05 NOTE — TELEPHONE ENCOUNTER
Specialty Pharmacy - Refill Coordination    Specialty Medication Orders Linked to Encounter    Flowsheet Row Most Recent Value   Medication #1 deutetrabenazine (AUSTEDO) 9 mg Tab (Order#[id number], Rx#[id number])          Refill Questions - Documented Responses    Flowsheet Row Most Recent Value   Patient Availability and HIPAA Verification    Does patient want to proceed with activity? Yes   HIPAA/medical authority confirmed? Yes   Relationship to patient of person spoken to? Spouse/Significant Other   Refill Screening Questions    Changes to allergies? No   Changes to medications? No   New conditions since last clinic visit? No   Unplanned office visit, urgent care, ED, or hospital admission in the last 4 weeks? No   How does patient/caregiver feel medication is working? Good   Financial problems or insurance changes? No   How many doses of your specialty medications were missed in the last 4 weeks? 0   Would patient like to speak to a pharmacist? No   When does the patient need to receive the medication? 07/10/22   Refill Delivery Questions    How will the patient receive the medication? Delivery Vikki   When does the patient need to receive the medication? 07/10/22   Shipping Address Home   Address in [facility] confirmed and updated if neccessary? Yes   Expected Copay ($) 0   Is the patient able to afford the medication copay? Yes   Payment Method zero copay   Days supply of Refill 30   Supplies needed? No supplies needed   Refill activity completed? Yes   Refill activity plan Refill scheduled   Shipment/Pickup Date: 07/07/22          Current Outpatient Medications   Medication Sig    amlodipine (NORVASC) 10 MG tablet     aspirin (ECOTRIN) 81 MG EC tablet Take 81 mg by mouth once daily.    atorvastatin (LIPITOR) 20 MG tablet Take 20 mg by mouth once daily.    atropine 1% (ISOPTO ATROPINE) 1 % Drop Place 2 drops under the tongue 2 (two) times daily as needed (for drooling).    cholecalciferol, vitamin  D3, (VITAMIN D3) 1,000 unit capsule Take 1,000 Units by mouth once daily.    clonazePAM (KLONOPIN) 0.5 MG tablet Take 1 tablet (0.5 mg total) by mouth 2 (two) times daily as needed for Anxiety.    deutetrabenazine (AUSTEDO) 9 mg Tab Take 3 tablets by mouth every morning AND 2 tablets every evening.    deutetrabenazine (AUSTEDO) 9 mg Tab Take 3 tablets (27 mg) by mouth every morning AND 2 tablets (18 mg) every evening.    docusate sodium (COLACE) 50 MG capsule Take by mouth 2 (two) times daily.    ferrous sulfate 325 mg (65 mg iron) Tab tablet Take 325 mg by mouth daily with breakfast.    finasteride (PROSCAR) 5 mg tablet 1 Tablet Once a day THANK YOU    gabapentin (NEURONTIN) 300 MG capsule Take 300 mg by mouth.    HYDROcodone-acetaminophen (NORCO) 7.5-325 mg per tablet     isosorbide mononitrate (IMDUR) 30 MG 24 hr tablet Take 20 mg by mouth once daily.    lisinopril 10 MG tablet Take 10 mg by mouth.    meclizine (ANTIVERT) 25 mg tablet     megestroL (MEGACE) 40 MG Tab Take 1 tablet (40 mg total) by mouth once daily.    methylphenidate HCl (RITALIN) 5 MG tablet Take 1 tablet (5 mg total) by mouth once daily.    nitroGLYCERIN (NITROSTAT) 0.4 MG SL tablet Place 0.4 mg under the tongue every 5 (five) minutes as needed for Chest pain.    omeprazole (PRILOSEC) 20 MG capsule Take 20 mg by mouth once daily.    ondansetron (ZOFRAN-ODT) 4 MG TbDL     oxybutynin (DITROPAN-XL) 10 MG 24 hr tablet Take 10 mg by mouth.    penicillin v potassium (VEETID) 500 MG tablet     permethrin (ELIMITE) 5 % cream     QUEtiapine (SEROQUEL) 25 MG Tab 12.5 mg.     ranitidine (ZANTAC) 150 MG tablet Take 150 mg by mouth.    senna-docusate 8.6-50 mg (PERICOLACE) 8.6-50 mg per tablet Take 1 tablet by mouth.    sertraline (ZOLOFT) 100 MG tablet Take 1.5 tablets (150 mg total) by mouth once daily.    tamsulosin (FLOMAX) 0.4 mg Cp24 Take 0.4 mg by mouth.    tizanidine 4 mg Cap Take 4 mg by mouth once daily.    trazodone  (DESYREL) 50 MG tablet Take 50 mg by mouth every evening.   Last reviewed on 3/16/2021  8:44 AM by Jesusita Darnell MA    Review of patient's allergies indicates:  Not on File Last reviewed on  5/26/2022 4:12 PM by Xavi Porter      Tasks added this encounter   8/2/2022 - Refill Call (Auto Added)   Tasks due within next 3 months   No tasks due.     Kaur Starr, PharmD  [facility]  [address] [place] [id number]  Phone: [phone]  Fax: [phone]

## 2022-07-08 ENCOUNTER — OFFICE VISIT (OUTPATIENT)
Dept: URGENT CARE | Facility: CLINIC | Age: 72
End: 2022-07-08
Payer: MEDICARE

## 2022-07-08 VITALS
WEIGHT: 155 LBS | HEART RATE: 105 BPM | SYSTOLIC BLOOD PRESSURE: 118 MMHG | OXYGEN SATURATION: 98 % | HEIGHT: 70 IN | BODY MASS INDEX: 22.19 KG/M2 | DIASTOLIC BLOOD PRESSURE: 77 MMHG

## 2022-07-08 DIAGNOSIS — R35.0 URINARY FREQUENCY: Primary | ICD-10-CM

## 2022-07-08 PROCEDURE — 99203 OFFICE O/P NEW LOW 30 MIN: CPT | Mod: S$GLB,,, | Performed by: FAMILY MEDICINE

## 2022-07-08 PROCEDURE — 99203 PR OFFICE/OUTPT VISIT, NEW, LEVL III, 30-44 MIN: ICD-10-PCS | Mod: S$GLB,,, | Performed by: FAMILY MEDICINE

## 2022-07-08 RX ORDER — CEPHALEXIN 500 MG/1
500 CAPSULE ORAL EVERY 12 HOURS
Qty: 20 CAPSULE | Refills: 0 | Status: CANCELLED | OUTPATIENT
Start: 2022-07-08 | End: 2022-07-18

## 2022-07-08 NOTE — PROGRESS NOTES
"Subjective:       Patient ID: Filiberto Godfrey is a 71 y.o. male.    Vitals:  height is 5' 10" (1.778 m) and weight is 70.3 kg (155 lb). His blood pressure is 118/77 and his pulse is 105. His oxygen saturation is 98%.     Chief Complaint: Urinary Retention    Patient presents today with problems urinating (frequency, incomplete emptying) that began 2 days ago.   Patient also complains of pain in pelvic/bladder area.  Patient has history of bladder cancer (about 3 years ago).  Patient also has history of prostate issues.  Patient urinates on himself in the middle of the night.  Patient has Hunnington's disease.     Other  This is a new problem. The current episode started in the past 7 days. Associated symptoms comments: Urgency/frequency to urinate, incomplete emptying.     ROS    Objective:      Physical Exam   Constitutional: He is oriented to person, place, and time.   HENT:   Head: Atraumatic.   Mouth/Throat: Mucous membranes are moist.   Cardiovascular: Tachycardia present.   Pulmonary/Chest: Effort normal.   Neurological: He is alert, oriented to person, place, and time and at baseline.       pt with HC, in wheelchair  Minimal verbal communication but able to gesture and indicates the inability to urinate at this time.     Assessment:       1. Urinary frequency          Plan:     pt with son- inquired if we have catheters in clinic. States that pt drinks abt a gallon of water per day. Unable to get urine sample. During visit, asked whether unable to void or frequency to determine if need for cath- pt indicated that he feels cath is necessary. Advised ER for further w/u- either st hosp or stand alone. . Pt and son amenable to plan.     Urinary frequency  -     Cancel: POCT Urinalysis, Dipstick, Automated, W/O Scope  -     Cancel: Urine culture                   "

## 2022-08-02 ENCOUNTER — SPECIALTY PHARMACY (OUTPATIENT)
Dept: PHARMACY | Facility: CLINIC | Age: 72
End: 2022-08-02
Payer: MEDICARE

## 2022-08-02 ENCOUNTER — PATIENT MESSAGE (OUTPATIENT)
Dept: NEUROLOGY | Facility: CLINIC | Age: 72
End: 2022-08-02
Payer: MEDICARE

## 2022-08-02 NOTE — TELEPHONE ENCOUNTER
Specialty Pharmacy - Refill Coordination    Specialty Medication Orders Linked to Encounter    Flowsheet Row Most Recent Value   Medication #1 deutetrabenazine (AUSTEDO) 9 mg Tab (Order#[id number], Rx#)          Refill Questions - Documented Responses    Flowsheet Row Most Recent Value   Refill Screening Questions    Changes to allergies? No   Changes to medications? No   New conditions since last clinic visit? No   Unplanned office visit, urgent care, ED, or hospital admission in the last 4 weeks? No   How does patient/caregiver feel medication is working? Excellent   Financial problems or insurance changes? No   How many doses of your specialty medications were missed in the last 4 weeks? 0   Would patient like to speak to a pharmacist? No   When does the patient need to receive the medication? 08/04/22   Refill Delivery Questions    How will the patient receive the medication? Mail   When does the patient need to receive the medication? 08/04/22   Shipping Address Prescription   Address in [facility] confirmed and updated if neccessary? Yes   Expected Copay ($) 0   Is the patient able to afford the medication copay? Yes   Payment Method zero copay   Days supply of Refill 30   Supplies needed? No supplies needed   Refill activity completed? Yes   Refill activity plan Refill scheduled   Shipment/Pickup Date: 08/03/22          Current Outpatient Medications   Medication Sig    amlodipine (NORVASC) 10 MG tablet     atropine 1% (ISOPTO ATROPINE) 1 % Drop Place 2 drops under the tongue 2 (two) times daily as needed (for drooling).    clonazePAM (KLONOPIN) 0.5 MG tablet Take 1 tablet (0.5 mg total) by mouth 2 (two) times daily as needed for Anxiety.    deutetrabenazine (AUSTEDO) 9 mg Tab Take 3 tablets by mouth every morning AND 2 tablets every evening.    deutetrabenazine (AUSTEDO) 9 mg Tab Take 3 tablets (27 mg) by mouth every morning AND 2 tablets (18 mg) every evening.    finasteride (PROSCAR) 5 mg  tablet 1 Tablet Once a day THANK YOU    gabapentin (NEURONTIN) 300 MG capsule Take 300 mg by mouth.    isosorbide dinitrate (ISORDIL) 10 MG tablet Take 10 mg by mouth 2 (two) times daily.    isosorbide mononitrate (IMDUR) 30 MG 24 hr tablet Take 20 mg by mouth once daily.    lisinopril 10 MG tablet Take 10 mg by mouth.    megestroL (MEGACE) 40 MG Tab Take 1 tablet (40 mg total) by mouth once daily.    QUEtiapine (SEROQUEL) 25 MG Tab 12.5 mg.     sertraline (ZOLOFT) 100 MG tablet Take 1.5 tablets (150 mg total) by mouth once daily.    simvastatin (ZOCOR) 20 MG tablet SMARTSI Tablet(s) By Mouth Every Evening    tamsulosin (FLOMAX) 0.4 mg Cp24 Take 0.4 mg by mouth.   Last reviewed on 2022  1:02 PM by Jaimee Leon RN    Review of patient's allergies indicates:  No Known Allergies Last reviewed on  2022 12:54 PM by Jaimee Leon      Tasks added this encounter   2022 - Refill Call (Auto Added)   Tasks due within next 3 months   No tasks due.     Gerardo Herrmann, PharmD  [facility]  [address] [place], [place] [address]  [facility] [id number]  Phone: [phone]  Fax: [phone]

## 2022-09-01 ENCOUNTER — SPECIALTY PHARMACY (OUTPATIENT)
Dept: PHARMACY | Facility: CLINIC | Age: 72
End: 2022-09-01
Payer: MEDICARE

## 2022-09-01 ENCOUNTER — PATIENT MESSAGE (OUTPATIENT)
Dept: PHARMACY | Facility: CLINIC | Age: 72
End: 2022-09-01
Payer: MEDICARE

## 2022-09-01 NOTE — TELEPHONE ENCOUNTER
Specialty Pharmacy - Refill Coordination    Specialty Medication Orders Linked to Encounter      Flowsheet Row Most Recent Value   Medication #1 deutetrabenazine (AUSTEDO) 9 mg Tab (Order#[id number], Rx#[id number])            Refill Questions - Documented Responses      Flowsheet Row Most Recent Value   Patient Availability and HIPAA Verification    Does patient want to proceed with activity? Yes   HIPAA/medical authority confirmed? Yes   Relationship to patient of person spoken to? Child   Refill Screening Questions    Changes to allergies? No   Changes to medications? No   New conditions since last clinic visit? No   Unplanned office visit, urgent care, ED, or hospital admission in the last 4 weeks? No   How does patient/caregiver feel medication is working? Very good   Financial problems or insurance changes? No   How many doses of your specialty medications were missed in the last 4 weeks? 0   Would patient like to speak to a pharmacist? No   When does the patient need to receive the medication? 09/03/22   Refill Delivery Questions    How will the patient receive the medication? Mail   When does the patient need to receive the medication? 09/03/22   Shipping Address Home   Address in [facility] confirmed and updated if neccessary? Yes   Expected Copay ($) 0   Is the patient able to afford the medication copay? Yes   Payment Method zero copay   Days supply of Refill 30   Supplies needed? No supplies needed   Refill activity completed? Yes   Refill activity plan Refill scheduled   Shipment/Pickup Date: 09/01/22            Current Outpatient Medications   Medication Sig    amlodipine (NORVASC) 10 MG tablet     atropine 1% (ISOPTO ATROPINE) 1 % Drop Place 2 drops under the tongue 2 (two) times daily as needed (for drooling).    clonazePAM (KLONOPIN) 0.5 MG tablet Take 1 tablet (0.5 mg total) by mouth 2 (two) times daily as needed for Anxiety.    deutetrabenazine (AUSTEDO) 9 mg Tab Take 3 tablets by mouth  every morning AND 2 tablets every evening.    deutetrabenazine (AUSTEDO) 9 mg Tab Take 3 tablets (27 mg) by mouth every morning AND 2 tablets (18 mg) every evening.    finasteride (PROSCAR) 5 mg tablet 1 Tablet Once a day THANK YOU    gabapentin (NEURONTIN) 300 MG capsule Take 300 mg by mouth.    isosorbide dinitrate (ISORDIL) 10 MG tablet Take 10 mg by mouth 2 (two) times daily.    isosorbide mononitrate (IMDUR) 30 MG 24 hr tablet Take 20 mg by mouth once daily.    lisinopril 10 MG tablet Take 10 mg by mouth.    megestroL (MEGACE) 40 MG Tab Take 1 tablet (40 mg total) by mouth once daily.    QUEtiapine (SEROQUEL) 25 MG Tab 12.5 mg.     sertraline (ZOLOFT) 100 MG tablet Take 1.5 tablets (150 mg total) by mouth once daily.    simvastatin (ZOCOR) 20 MG tablet SMARTSI Tablet(s) By Mouth Every Evening    tamsulosin (FLOMAX) 0.4 mg Cp24 Take 0.4 mg by mouth.   Last reviewed on 2022  1:02 PM by Jaimee Leon RN    Review of patient's allergies indicates:  No Known Allergies Last reviewed on  2022 12:54 PM by Jaimee Leon      Tasks added this encounter   2022 - Refill Call (Auto Added)   Tasks due within next 3 months   No tasks due.     Aruna Awad, PharmD  [facility]  [address] [place] [id number]  Phone: [phone]  Fax: [phone]

## 2022-09-29 ENCOUNTER — PATIENT MESSAGE (OUTPATIENT)
Dept: PHARMACY | Facility: CLINIC | Age: 72
End: 2022-09-29
Payer: MEDICARE

## 2022-10-05 ENCOUNTER — SPECIALTY PHARMACY (OUTPATIENT)
Dept: PHARMACY | Facility: CLINIC | Age: 72
End: 2022-10-05
Payer: MEDICARE

## 2022-10-05 NOTE — TELEPHONE ENCOUNTER
Call for patient's refill. Patient's daughter informs that patient has passed away. Closing [facility] enrollment.